# Patient Record
Sex: MALE | Race: WHITE | NOT HISPANIC OR LATINO | Employment: FULL TIME | URBAN - METROPOLITAN AREA
[De-identification: names, ages, dates, MRNs, and addresses within clinical notes are randomized per-mention and may not be internally consistent; named-entity substitution may affect disease eponyms.]

---

## 2017-08-11 ENCOUNTER — OFFICE VISIT (OUTPATIENT)
Dept: CARDIOLOGY | Facility: CLINIC | Age: 57
End: 2017-08-11
Payer: COMMERCIAL

## 2017-08-11 VITALS
SYSTOLIC BLOOD PRESSURE: 135 MMHG | DIASTOLIC BLOOD PRESSURE: 76 MMHG | BODY MASS INDEX: 36.62 KG/M2 | HEART RATE: 81 BPM | WEIGHT: 241.63 LBS | HEIGHT: 68 IN

## 2017-08-11 DIAGNOSIS — I25.10 CORONARY ARTERY DISEASE INVOLVING NATIVE CORONARY ARTERY OF NATIVE HEART WITHOUT ANGINA PECTORIS: Chronic | ICD-10-CM

## 2017-08-11 DIAGNOSIS — E78.5 DYSLIPIDEMIA: Chronic | ICD-10-CM

## 2017-08-11 DIAGNOSIS — Z95.5 STENTED CORONARY ARTERY: Chronic | ICD-10-CM

## 2017-08-11 DIAGNOSIS — I10 HYPERTENSION, ESSENTIAL: Primary | ICD-10-CM

## 2017-08-11 PROCEDURE — 99204 OFFICE O/P NEW MOD 45 MIN: CPT | Mod: S$GLB,,, | Performed by: INTERNAL MEDICINE

## 2017-08-11 PROCEDURE — 99999 PR PBB SHADOW E&M-EST. PATIENT-LVL III: CPT | Mod: PBBFAC,,, | Performed by: INTERNAL MEDICINE

## 2017-08-11 PROCEDURE — 3008F BODY MASS INDEX DOCD: CPT | Mod: S$GLB,,, | Performed by: INTERNAL MEDICINE

## 2017-08-11 PROCEDURE — 3078F DIAST BP <80 MM HG: CPT | Mod: S$GLB,,, | Performed by: INTERNAL MEDICINE

## 2017-08-11 PROCEDURE — 3075F SYST BP GE 130 - 139MM HG: CPT | Mod: S$GLB,,, | Performed by: INTERNAL MEDICINE

## 2017-08-11 RX ORDER — CLOPIDOGREL BISULFATE 75 MG/1
75 TABLET ORAL DAILY
Refills: 6 | COMMUNITY
Start: 2017-07-12 | End: 2017-08-11 | Stop reason: SDUPTHER

## 2017-08-11 RX ORDER — CLOPIDOGREL BISULFATE 75 MG/1
75 TABLET ORAL DAILY
Qty: 90 TABLET | Refills: 6 | Status: SHIPPED | OUTPATIENT
Start: 2017-08-11 | End: 2018-08-27 | Stop reason: SDUPTHER

## 2017-08-11 RX ORDER — LISINOPRIL 10 MG/1
10 TABLET ORAL DAILY
Qty: 90 TABLET | Refills: 6 | Status: SHIPPED | OUTPATIENT
Start: 2017-08-11 | End: 2018-01-15 | Stop reason: SINTOL

## 2017-08-11 RX ORDER — NITROGLYCERIN 0.4 MG/1
TABLET SUBLINGUAL
Refills: 6 | COMMUNITY
Start: 2017-06-16 | End: 2017-08-11 | Stop reason: SDUPTHER

## 2017-08-11 RX ORDER — METOPROLOL SUCCINATE 50 MG/1
50 TABLET, EXTENDED RELEASE ORAL DAILY
Refills: 6 | COMMUNITY
Start: 2017-07-16 | End: 2017-08-11 | Stop reason: SDUPTHER

## 2017-08-11 RX ORDER — NITROGLYCERIN 0.4 MG/1
0.4 TABLET SUBLINGUAL EVERY 5 MIN PRN
Qty: 25 TABLET | Refills: 6 | Status: SHIPPED | OUTPATIENT
Start: 2017-08-11 | End: 2018-09-29 | Stop reason: SDUPTHER

## 2017-08-11 RX ORDER — LISINOPRIL 10 MG/1
10 TABLET ORAL 2 TIMES DAILY
Refills: 6 | COMMUNITY
Start: 2017-07-16 | End: 2017-08-11 | Stop reason: SDUPTHER

## 2017-08-11 RX ORDER — ASPIRIN 81 MG/1
81 TABLET ORAL DAILY
Qty: 30 TABLET | Refills: 11
Start: 2017-08-11 | End: 2020-07-28

## 2017-08-11 RX ORDER — ATORVASTATIN CALCIUM 40 MG/1
40 TABLET, FILM COATED ORAL DAILY
Refills: 6 | COMMUNITY
Start: 2017-07-16 | End: 2017-08-11 | Stop reason: SDUPTHER

## 2017-08-11 RX ORDER — ASPIRIN 325 MG
325 TABLET ORAL DAILY
Refills: 6 | COMMUNITY
Start: 2017-07-21 | End: 2017-08-11

## 2017-08-11 RX ORDER — ATORVASTATIN CALCIUM 40 MG/1
40 TABLET, FILM COATED ORAL DAILY
Qty: 90 TABLET | Refills: 6 | Status: SHIPPED | OUTPATIENT
Start: 2017-08-11 | End: 2018-05-26 | Stop reason: SDUPTHER

## 2017-08-11 RX ORDER — METOPROLOL SUCCINATE 50 MG/1
50 TABLET, EXTENDED RELEASE ORAL DAILY
Qty: 90 TABLET | Refills: 6 | Status: SHIPPED | OUTPATIENT
Start: 2017-08-11 | End: 2018-05-26 | Stop reason: SDUPTHER

## 2017-08-11 NOTE — PROGRESS NOTES
Subjective:    Patient ID:  Niraj Diaz is a 57 y.o. male who presents for evaluation of No chief complaint on file.      HPI  Here to establish/transfer care from Dr. DEV Spears concerning CAD-PCI (GARY overlapping 3/2017 ). Had classic angina none since PCI. Very active.     Review of Systems   Constitution: Negative for decreased appetite and malaise/fatigue.   HENT: Negative for congestion and nosebleeds.    Eyes: Negative for blurred vision.   Cardiovascular: Negative for chest pain, claudication, cyanosis, dyspnea on exertion, irregular heartbeat, leg swelling, near-syncope, orthopnea, palpitations, paroxysmal nocturnal dyspnea and syncope.   Respiratory: Negative for cough and shortness of breath.    Endocrine: Negative for polyuria.   Hematologic/Lymphatic: Does not bruise/bleed easily.   Musculoskeletal: Negative for back pain, falls, joint pain, joint swelling, muscle cramps, muscle weakness and myalgias.   Gastrointestinal: Negative for bloating, abdominal pain, change in bowel habit, nausea and vomiting.   Genitourinary: Negative for urgency.   Neurological: Negative for dizziness, focal weakness and light-headedness.   Psychiatric/Behavioral: Negative for altered mental status.        Objective:    Physical Exam   Constitutional: He is oriented to person, place, and time. He appears well-developed and well-nourished. He is cooperative.   HENT:   Head: Normocephalic and atraumatic.   Eyes: Conjunctivae are normal. Right eye exhibits no exudate. Left eye exhibits no exudate.   Neck: Normal range of motion. Neck supple. Normal carotid pulses and no JVD present. Carotid bruit is not present. No thyromegaly present.   Cardiovascular: Normal rate, regular rhythm, normal heart sounds and intact distal pulses.    Pulses:       Carotid pulses are 2+ on the right side, and 2+ on the left side.       Radial pulses are 2+ on the right side, and 2+ on the left side.        Dorsalis pedis pulses are 2+ on the right  side, and 2+ on the left side.        Posterior tibial pulses are 2+ on the right side, and 2+ on the left side.   Pulmonary/Chest: Effort normal and breath sounds normal.   Abdominal: Soft. Bowel sounds are normal.   Musculoskeletal: Normal range of motion. He exhibits no edema.   Neurological: He is alert and oriented to person, place, and time. Gait normal.   Skin: Skin is warm, dry and intact. No cyanosis. Nails show no clubbing.   Psychiatric: He has a normal mood and affect. His speech is normal and behavior is normal. Judgment and thought content normal.   Nursing note and vitals reviewed.        4/2017 LDL 54     HDL 35    ..    Chemistry        Component Value Date/Time     10/29/2013 0826    K 3.8 10/29/2013 0826     10/29/2013 0826    CO2 29 10/29/2013 0826    BUN 10 10/29/2013 0826    CREATININE 1.0 10/29/2013 0826    GLU 94 10/29/2013 0826        Component Value Date/Time    CALCIUM 10.0 10/29/2013 0826    ALKPHOS 76 10/29/2013 0826    AST 24 10/29/2013 0826    ALT 39 10/29/2013 0826    BILITOT 1.0 10/29/2013 0826    ESTGFRAFRICA >60.0 10/29/2013 0826    EGFRNONAA >60.0 10/29/2013 0826            ..  Lab Results   Component Value Date    CHOL 226 (H) 10/29/2013    CHOL 216 (H) 01/14/2009    CHOL 216 (H) 07/24/2006     Lab Results   Component Value Date    HDL 49 10/29/2013    HDL 47 01/14/2009    HDL 47.0 07/24/2006     Lab Results   Component Value Date    LDLCALC 156.6 10/29/2013    LDLCALC 144.8 (H) 01/14/2009    LDLCALC 131.4 (H) 07/24/2006     Lab Results   Component Value Date    TRIG 102 10/29/2013    TRIG 121 01/14/2009    TRIG 188 (H) 07/24/2006     Lab Results   Component Value Date    CHOLHDL 21.7 10/29/2013    CHOLHDL 21.8 01/14/2009    CHOLHDL 21.8 07/24/2006     ..  Lab Results   Component Value Date    WBC 9.55 10/29/2013    HGB 17.1 10/29/2013    HCT 48.0 10/29/2013    MCV 81 (L) 10/29/2013     10/29/2013       Test(s) Reviewed  I have reviewed the following in  detail:  [] Stress test   [x] Angiography   [x] Echocardiogram   [x] Labs   [x] Other:       Assessment:         ICD-10-CM ICD-9-CM   1. Hypertension, essential I10 401.9   2. Stented coronary artery Z95.5 V45.82   3. Coronary artery disease involving native coronary artery of native heart without angina pectoris I25.10 414.01   4. Dyslipidemia E78.5 272.4     Problem List Items Addressed This Visit     Coronary artery disease involving native coronary artery of native heart (Chronic)    Dyslipidemia (Chronic)    Hypertension, essential - Primary    Stented coronary artery (Chronic)      Other Visit Diagnoses    None.          Plan:           Return to clinic 6 months   Aerobic exercise 5x's/wk. Heart healthy diet and risk factor modification.    See labs and med orders.  Get LV records  Tm Nuc/CFD.

## 2017-09-07 ENCOUNTER — PATIENT MESSAGE (OUTPATIENT)
Dept: CARDIOLOGY | Facility: CLINIC | Age: 57
End: 2017-09-07

## 2017-09-13 ENCOUNTER — PATIENT MESSAGE (OUTPATIENT)
Dept: CARDIOLOGY | Facility: CLINIC | Age: 57
End: 2017-09-13

## 2017-10-13 ENCOUNTER — HOSPITAL ENCOUNTER (OUTPATIENT)
Dept: RADIOLOGY | Facility: HOSPITAL | Age: 57
Discharge: HOME | End: 2017-10-13
Attending: INTERNAL MEDICINE
Payer: COMMERCIAL

## 2017-10-13 ENCOUNTER — CLINICAL SUPPORT (OUTPATIENT)
Dept: CARDIOLOGY | Facility: CLINIC | Age: 57
End: 2017-10-13
Payer: COMMERCIAL

## 2017-10-13 DIAGNOSIS — Z95.5 STENTED CORONARY ARTERY: Chronic | ICD-10-CM

## 2017-10-13 DIAGNOSIS — I25.10 CORONARY ARTERY DISEASE INVOLVING NATIVE CORONARY ARTERY OF NATIVE HEART WITHOUT ANGINA PECTORIS: Chronic | ICD-10-CM

## 2017-10-13 PROCEDURE — 78452 HT MUSCLE IMAGE SPECT MULT: CPT | Mod: TC,PO

## 2017-10-13 PROCEDURE — 78452 HT MUSCLE IMAGE SPECT MULT: CPT | Mod: 26,,, | Performed by: INTERNAL MEDICINE

## 2017-10-13 PROCEDURE — 93306 TTE W/DOPPLER COMPLETE: CPT | Mod: S$GLB,,, | Performed by: INTERNAL MEDICINE

## 2017-10-13 PROCEDURE — 93015 CV STRESS TEST SUPVJ I&R: CPT | Mod: S$GLB,,, | Performed by: INTERNAL MEDICINE

## 2017-10-16 ENCOUNTER — TELEPHONE (OUTPATIENT)
Dept: CARDIOLOGY | Facility: CLINIC | Age: 57
End: 2017-10-16

## 2017-10-16 LAB
DIASTOLIC DYSFUNCTION: NO
DIASTOLIC DYSFUNCTION: YES
RETIRED EF AND QEF - SEE NOTES: 55 (ref 55–65)
TRICUSPID VALVE REGURGITATION: ABNORMAL

## 2017-10-16 NOTE — TELEPHONE ENCOUNTER
----- Message from Bunny Magaña MD sent at 10/16/2017  1:09 PM CDT -----  Imaging tests results are with in normal parameters. Continue current meds.

## 2017-10-16 NOTE — TELEPHONE ENCOUNTER
Attempted to contact patient no answer left a message, echo test results are within normal parameters as per Dr. Magaña.

## 2017-10-16 NOTE — TELEPHONE ENCOUNTER
----- Message from Bunny Magaña MD sent at 10/16/2017 10:29 AM CDT -----  Imaging tests results are with in normal parameters. Continue current meds.

## 2017-10-18 ENCOUNTER — TELEPHONE (OUTPATIENT)
Dept: CARDIOLOGY | Facility: CLINIC | Age: 57
End: 2017-10-18

## 2017-10-18 NOTE — TELEPHONE ENCOUNTER
----- Message from Bunny Magaña MD sent at 10/18/2017  8:35 AM CDT -----  Imaging tests results are with in normal parameters. Continue current meds.

## 2018-01-12 ENCOUNTER — PATIENT MESSAGE (OUTPATIENT)
Dept: CARDIOLOGY | Facility: CLINIC | Age: 58
End: 2018-01-12

## 2018-01-16 RX ORDER — VALSARTAN 80 MG/1
80 TABLET ORAL DAILY
Qty: 90 TABLET | Refills: 3 | Status: SHIPPED | OUTPATIENT
Start: 2018-01-16 | End: 2018-03-05 | Stop reason: SDUPTHER

## 2018-01-20 ENCOUNTER — PATIENT MESSAGE (OUTPATIENT)
Dept: CARDIOLOGY | Facility: CLINIC | Age: 58
End: 2018-01-20

## 2018-01-31 ENCOUNTER — TELEPHONE (OUTPATIENT)
Dept: FAMILY MEDICINE | Facility: CLINIC | Age: 58
End: 2018-01-31

## 2018-01-31 NOTE — TELEPHONE ENCOUNTER
I would advise him to follow up with his PCP. Is Lor still his PCP? It appears that he hasn't seen him since 2016. Our clinic is not accepting new patients at present.

## 2018-01-31 NOTE — TELEPHONE ENCOUNTER
----- Message from Ashlie Hernandez sent at 1/31/2018  7:12 AM CST -----  Contact: Patients wife, Aure  Ms Aure would like for her  to be seen today for stomach issues but patient has not been seen in [place] since past 2014, was explained that after 3 years patient is considered a new patient and panel is closed, but Ms Diaz states she see's Ms Herrera and wants her to see her . Please call her back at [phone]. Thank you

## 2018-02-28 ENCOUNTER — PATIENT MESSAGE (OUTPATIENT)
Dept: CARDIOLOGY | Facility: CLINIC | Age: 58
End: 2018-02-28

## 2018-03-05 ENCOUNTER — PATIENT MESSAGE (OUTPATIENT)
Dept: CARDIOLOGY | Facility: CLINIC | Age: 58
End: 2018-03-05

## 2018-03-05 ENCOUNTER — PATIENT MESSAGE (OUTPATIENT)
Dept: PAIN MEDICINE | Facility: CLINIC | Age: 58
End: 2018-03-05

## 2018-03-06 RX ORDER — VALSARTAN 80 MG/1
80 TABLET ORAL DAILY
Qty: 90 TABLET | Refills: 4 | Status: SHIPPED | OUTPATIENT
Start: 2018-03-06 | End: 2018-08-27 | Stop reason: SDUPTHER

## 2018-05-04 ENCOUNTER — OFFICE VISIT (OUTPATIENT)
Dept: CARDIOLOGY | Facility: CLINIC | Age: 58
End: 2018-05-04
Payer: COMMERCIAL

## 2018-05-04 VITALS
SYSTOLIC BLOOD PRESSURE: 136 MMHG | DIASTOLIC BLOOD PRESSURE: 87 MMHG | HEIGHT: 68 IN | BODY MASS INDEX: 35.25 KG/M2 | WEIGHT: 232.56 LBS | HEART RATE: 72 BPM

## 2018-05-04 DIAGNOSIS — Z95.5 STENTED CORONARY ARTERY: Chronic | ICD-10-CM

## 2018-05-04 DIAGNOSIS — E78.5 DYSLIPIDEMIA: Chronic | ICD-10-CM

## 2018-05-04 DIAGNOSIS — I10 HYPERTENSION, ESSENTIAL: ICD-10-CM

## 2018-05-04 DIAGNOSIS — I25.10 CORONARY ARTERY DISEASE INVOLVING NATIVE CORONARY ARTERY OF NATIVE HEART WITHOUT ANGINA PECTORIS: Primary | Chronic | ICD-10-CM

## 2018-05-04 PROCEDURE — 3075F SYST BP GE 130 - 139MM HG: CPT | Mod: CPTII,S$GLB,, | Performed by: INTERNAL MEDICINE

## 2018-05-04 PROCEDURE — 99214 OFFICE O/P EST MOD 30 MIN: CPT | Mod: S$GLB,,, | Performed by: INTERNAL MEDICINE

## 2018-05-04 PROCEDURE — 3008F BODY MASS INDEX DOCD: CPT | Mod: CPTII,S$GLB,, | Performed by: INTERNAL MEDICINE

## 2018-05-04 PROCEDURE — 99999 PR PBB SHADOW E&M-EST. PATIENT-LVL III: CPT | Mod: PBBFAC,,, | Performed by: INTERNAL MEDICINE

## 2018-05-04 PROCEDURE — 3079F DIAST BP 80-89 MM HG: CPT | Mod: CPTII,S$GLB,, | Performed by: INTERNAL MEDICINE

## 2018-05-04 NOTE — PROGRESS NOTES
Subjective:    Patient ID:  Niraj Diaz is a 58 y.o. male who presents for follow-up of Coronary Artery Disease and Hypertension      HPI  Here for follow up of CAD-PCI (GARY overlapping 3/2017 ). Had classic angina none since PCI. Patients states is doing well no chest pain, SOB or change in exertional tolerence. Patient is exercising regularly with out symptoms.    Review of Systems   Constitution: Negative for malaise/fatigue.   Eyes: Negative for blurred vision.   Cardiovascular: Negative for chest pain, claudication, cyanosis, dyspnea on exertion, irregular heartbeat, leg swelling, near-syncope, orthopnea, palpitations, paroxysmal nocturnal dyspnea and syncope.   Respiratory: Negative for cough and shortness of breath.    Hematologic/Lymphatic: Does not bruise/bleed easily.   Musculoskeletal: Negative for back pain, falls, joint pain, muscle cramps, muscle weakness and myalgias.   Gastrointestinal: Negative for abdominal pain, change in bowel habit, nausea and vomiting.   Genitourinary: Negative for urgency.   Neurological: Negative for dizziness, focal weakness and light-headedness.        Objective:    Physical Exam   Constitutional: He is oriented to person, place, and time. He appears well-developed and well-nourished. He is cooperative.   HENT:   Head: Normocephalic and atraumatic.   Eyes: Conjunctivae are normal. Right eye exhibits no exudate. Left eye exhibits no exudate.   Neck: Normal range of motion. Neck supple. Normal carotid pulses and no JVD present. Carotid bruit is not present. No thyromegaly present.   Cardiovascular: Normal rate, regular rhythm, normal heart sounds and intact distal pulses.    Pulses:       Carotid pulses are 2+ on the right side, and 2+ on the left side.       Radial pulses are 2+ on the right side, and 2+ on the left side.        Dorsalis pedis pulses are 2+ on the right side, and 2+ on the left side.        Posterior tibial pulses are 2+ on the right side, and 2+ on the  left side.   Pulmonary/Chest: Effort normal and breath sounds normal.   Abdominal: Soft. Bowel sounds are normal.   Musculoskeletal: Normal range of motion. He exhibits no edema.   Neurological: He is alert and oriented to person, place, and time. Gait normal.   Skin: Skin is warm, dry and intact. No cyanosis. Nails show no clubbing.   Psychiatric: He has a normal mood and affect. His speech is normal and behavior is normal. Judgment and thought content normal.   Nursing note and vitals reviewed.            ..    Chemistry        Component Value Date/Time     09/03/2017 1043    K 4.2 09/03/2017 1043     09/03/2017 1043    CO2 27 09/03/2017 1043    BUN 9 09/03/2017 1043    CREATININE 0.78 09/03/2017 1043     (H) 09/03/2017 1043        Component Value Date/Time    CALCIUM 9.8 09/03/2017 1043    ALKPHOS 78 09/03/2017 1043    AST 33 09/03/2017 1043    ALT 49 (H) 09/03/2017 1043    BILITOT 0.8 09/03/2017 1043    ESTGFRAFRICA >60 09/03/2017 1043    EGFRNONAA >60 09/03/2017 1043            ..  Lab Results   Component Value Date    CHOL 226 (H) 10/29/2013    CHOL 216 (H) 01/14/2009    CHOL 216 (H) 07/24/2006     Lab Results   Component Value Date    HDL 49 10/29/2013    HDL 47 01/14/2009    HDL 47.0 07/24/2006     Lab Results   Component Value Date    LDLCALC 156.6 10/29/2013    LDLCALC 144.8 (H) 01/14/2009    LDLCALC 131.4 (H) 07/24/2006     Lab Results   Component Value Date    TRIG 102 10/29/2013    TRIG 121 01/14/2009    TRIG 188 (H) 07/24/2006     Lab Results   Component Value Date    CHOLHDL 21.7 10/29/2013    CHOLHDL 21.8 01/14/2009    CHOLHDL 21.8 07/24/2006     ..  Lab Results   Component Value Date    WBC 7.24 09/03/2017    HGB 15.7 09/03/2017    HCT 45.1 09/03/2017    MCV 86 09/03/2017     09/03/2017       Test(s) Reviewed  I have reviewed the following in detail:  [] Stress test   [] Angiography   [x] Echocardiogram   [x] Labs   [x] Other:       Assessment:         ICD-10-CM ICD-9-CM    1. Coronary artery disease involving native coronary artery of native heart without angina pectoris I25.10 414.01   2. Hypertension, essential I10 401.9   3. Stented coronary artery Z95.5 V45.82   4. Dyslipidemia E78.5 272.4     Problem List Items Addressed This Visit     Coronary artery disease involving native coronary artery of native heart - Primary (Chronic)    Overview     3/2017 lhc (LV)-LM- 35%, LAD-95% mid, 75% distal. D1- 60%. CX-95%. RCA-35%         Dyslipidemia (Chronic)    Hypertension, essential    Stented coronary artery (Chronic)    Overview     3/2017-LAD- synergy 3x28 distal and mid post 3.5. CX-2.5x38 resolute post 3.5                Plan:           Return to clinic 9 months   Aerobic exercise 5x's/wk. Heart healthy diet and risk factor modification.    See labs and med orders.

## 2018-05-26 DIAGNOSIS — I25.10 CORONARY ARTERY DISEASE INVOLVING NATIVE CORONARY ARTERY OF NATIVE HEART WITHOUT ANGINA PECTORIS: Chronic | ICD-10-CM

## 2018-05-26 DIAGNOSIS — E78.5 DYSLIPIDEMIA: Chronic | ICD-10-CM

## 2018-05-26 DIAGNOSIS — Z95.5 STENTED CORONARY ARTERY: Chronic | ICD-10-CM

## 2018-05-28 RX ORDER — ATORVASTATIN CALCIUM 40 MG/1
TABLET, FILM COATED ORAL
Qty: 30 TABLET | Refills: 6 | Status: SHIPPED | OUTPATIENT
Start: 2018-05-28 | End: 2019-01-22 | Stop reason: SDUPTHER

## 2018-05-28 RX ORDER — METOPROLOL SUCCINATE 50 MG/1
TABLET, EXTENDED RELEASE ORAL
Qty: 30 TABLET | Refills: 6 | Status: SHIPPED | OUTPATIENT
Start: 2018-05-28 | End: 2019-01-03 | Stop reason: SDUPTHER

## 2018-07-09 ENCOUNTER — OFFICE VISIT (OUTPATIENT)
Dept: OPTOMETRY | Facility: CLINIC | Age: 58
End: 2018-07-09
Payer: COMMERCIAL

## 2018-07-09 DIAGNOSIS — H16.202 KERATOCONJUNCTIVITIS OF LEFT EYE: Primary | ICD-10-CM

## 2018-07-09 PROCEDURE — 92002 INTRM OPH EXAM NEW PATIENT: CPT | Mod: S$GLB,,, | Performed by: OPTOMETRIST

## 2018-07-09 PROCEDURE — 99999 PR PBB SHADOW E&M-EST. PATIENT-LVL I: CPT | Mod: PBBFAC,,, | Performed by: OPTOMETRIST

## 2018-07-14 NOTE — PROGRESS NOTES
HPI     58yr old male here for FBS OS. Pt was using a weed eater when something   flew in his left eye x 2days. Pt feels like something still in the eye   today, no pain just discomfort. Pt having dryness and tearing OS, he used   Visine PRN-OU. Some blurriness OS this morning.    Last edited by Constantin Khalil on 7/9/2018 10:14 AM. (History)            Assessment /Plan     For exam results, see Encounter Report.    Keratoconjunctivitis of left eye  No foreign body seen with lid eversion today    -     tobramycin-lotepred (ZYLET) 0.3-0.5 % DrpS; Apply 1 drop to eye 4 (four) times daily. for 7 days  Dispense: 5 mL; Refill: 0     RTC 1 month for complete exam/ DFE

## 2018-08-20 ENCOUNTER — PATIENT MESSAGE (OUTPATIENT)
Dept: ADMINISTRATIVE | Facility: HOSPITAL | Age: 58
End: 2018-08-20

## 2018-08-26 ENCOUNTER — PATIENT MESSAGE (OUTPATIENT)
Dept: CARDIOLOGY | Facility: CLINIC | Age: 58
End: 2018-08-26

## 2018-08-26 DIAGNOSIS — E78.5 DYSLIPIDEMIA: Chronic | ICD-10-CM

## 2018-08-26 DIAGNOSIS — Z95.5 STENTED CORONARY ARTERY: Chronic | ICD-10-CM

## 2018-08-26 DIAGNOSIS — I25.10 CORONARY ARTERY DISEASE INVOLVING NATIVE CORONARY ARTERY OF NATIVE HEART WITHOUT ANGINA PECTORIS: Chronic | ICD-10-CM

## 2018-08-27 RX ORDER — CLOPIDOGREL BISULFATE 75 MG/1
75 TABLET ORAL DAILY
Qty: 90 TABLET | Refills: 6 | Status: SHIPPED | OUTPATIENT
Start: 2018-08-27 | End: 2019-11-21 | Stop reason: SDUPTHER

## 2018-08-27 RX ORDER — VALSARTAN 80 MG/1
80 TABLET ORAL DAILY
Qty: 90 TABLET | Refills: 4 | Status: SHIPPED | OUTPATIENT
Start: 2018-08-27 | End: 2019-03-03 | Stop reason: SDUPTHER

## 2018-09-29 DIAGNOSIS — E78.5 DYSLIPIDEMIA: Chronic | ICD-10-CM

## 2018-09-29 DIAGNOSIS — I25.10 CORONARY ARTERY DISEASE INVOLVING NATIVE CORONARY ARTERY OF NATIVE HEART WITHOUT ANGINA PECTORIS: Chronic | ICD-10-CM

## 2018-09-29 DIAGNOSIS — Z95.5 STENTED CORONARY ARTERY: Chronic | ICD-10-CM

## 2018-10-01 RX ORDER — NITROGLYCERIN 0.4 MG/1
TABLET SUBLINGUAL
Qty: 25 TABLET | Refills: 4 | Status: SHIPPED | OUTPATIENT
Start: 2018-10-01 | End: 2019-02-08 | Stop reason: SDUPTHER

## 2019-01-03 DIAGNOSIS — E78.5 DYSLIPIDEMIA: Chronic | ICD-10-CM

## 2019-01-03 DIAGNOSIS — Z95.5 STENTED CORONARY ARTERY: Chronic | ICD-10-CM

## 2019-01-03 DIAGNOSIS — I25.10 CORONARY ARTERY DISEASE INVOLVING NATIVE CORONARY ARTERY OF NATIVE HEART WITHOUT ANGINA PECTORIS: Chronic | ICD-10-CM

## 2019-01-08 RX ORDER — METOPROLOL SUCCINATE 50 MG/1
TABLET, EXTENDED RELEASE ORAL
Qty: 30 TABLET | Refills: 4 | Status: SHIPPED | OUTPATIENT
Start: 2019-01-08 | End: 2019-06-15 | Stop reason: SDUPTHER

## 2019-01-18 ENCOUNTER — OFFICE VISIT (OUTPATIENT)
Dept: CARDIOLOGY | Facility: CLINIC | Age: 59
End: 2019-01-18
Payer: COMMERCIAL

## 2019-01-18 VITALS
DIASTOLIC BLOOD PRESSURE: 65 MMHG | WEIGHT: 208.31 LBS | HEIGHT: 68 IN | HEART RATE: 68 BPM | BODY MASS INDEX: 31.57 KG/M2 | SYSTOLIC BLOOD PRESSURE: 125 MMHG

## 2019-01-18 DIAGNOSIS — I10 HYPERTENSION, ESSENTIAL: ICD-10-CM

## 2019-01-18 DIAGNOSIS — I25.10 CORONARY ARTERY DISEASE INVOLVING NATIVE CORONARY ARTERY OF NATIVE HEART WITHOUT ANGINA PECTORIS: Chronic | ICD-10-CM

## 2019-01-18 DIAGNOSIS — E78.5 DYSLIPIDEMIA: Chronic | ICD-10-CM

## 2019-01-18 DIAGNOSIS — Z95.5 STENTED CORONARY ARTERY: Primary | Chronic | ICD-10-CM

## 2019-01-18 PROCEDURE — 99214 OFFICE O/P EST MOD 30 MIN: CPT | Mod: S$GLB,,, | Performed by: INTERNAL MEDICINE

## 2019-01-18 PROCEDURE — 3008F PR BODY MASS INDEX (BMI) DOCUMENTED: ICD-10-PCS | Mod: CPTII,S$GLB,, | Performed by: INTERNAL MEDICINE

## 2019-01-18 PROCEDURE — 3078F PR MOST RECENT DIASTOLIC BLOOD PRESSURE < 80 MM HG: ICD-10-PCS | Mod: CPTII,S$GLB,, | Performed by: INTERNAL MEDICINE

## 2019-01-18 PROCEDURE — 99999 PR PBB SHADOW E&M-EST. PATIENT-LVL III: ICD-10-PCS | Mod: PBBFAC,,, | Performed by: INTERNAL MEDICINE

## 2019-01-18 PROCEDURE — 3008F BODY MASS INDEX DOCD: CPT | Mod: CPTII,S$GLB,, | Performed by: INTERNAL MEDICINE

## 2019-01-18 PROCEDURE — 99999 PR PBB SHADOW E&M-EST. PATIENT-LVL III: CPT | Mod: PBBFAC,,, | Performed by: INTERNAL MEDICINE

## 2019-01-18 PROCEDURE — 3078F DIAST BP <80 MM HG: CPT | Mod: CPTII,S$GLB,, | Performed by: INTERNAL MEDICINE

## 2019-01-18 PROCEDURE — 99214 PR OFFICE/OUTPT VISIT, EST, LEVL IV, 30-39 MIN: ICD-10-PCS | Mod: S$GLB,,, | Performed by: INTERNAL MEDICINE

## 2019-01-18 PROCEDURE — 3074F PR MOST RECENT SYSTOLIC BLOOD PRESSURE < 130 MM HG: ICD-10-PCS | Mod: CPTII,S$GLB,, | Performed by: INTERNAL MEDICINE

## 2019-01-18 PROCEDURE — 3074F SYST BP LT 130 MM HG: CPT | Mod: CPTII,S$GLB,, | Performed by: INTERNAL MEDICINE

## 2019-01-18 NOTE — PROGRESS NOTES
Subjective:    Patient ID:  Niraj Diaz is a 58 y.o. male who presents for follow-up of Follow-up      HPI  Here for follow up of CAD-PCI (GARY overlapping 3/2017 ). Had classic angina none since PCI.   Patients states is doing well no chest pain, SOB or change in exertional tolerence. Patient is exercising regularly with out symptoms.    Review of Systems   Constitution: Negative for malaise/fatigue.   Eyes: Negative for blurred vision.   Cardiovascular: Negative for chest pain, claudication, cyanosis, dyspnea on exertion, irregular heartbeat, leg swelling, near-syncope, orthopnea, palpitations, paroxysmal nocturnal dyspnea and syncope.   Respiratory: Negative for cough and shortness of breath.    Hematologic/Lymphatic: Does not bruise/bleed easily.   Musculoskeletal: Negative for back pain, falls, joint pain, muscle cramps, muscle weakness and myalgias.   Gastrointestinal: Negative for abdominal pain, change in bowel habit, nausea and vomiting.   Genitourinary: Negative for urgency.   Neurological: Negative for dizziness, focal weakness and light-headedness.           Objective:    Physical Exam   Constitutional: He is oriented to person, place, and time. He appears well-developed and well-nourished. He is cooperative.   HENT:   Head: Normocephalic and atraumatic.   Eyes: Conjunctivae are normal. Right eye exhibits no exudate. Left eye exhibits no exudate.   Neck: Normal range of motion. Neck supple. Normal carotid pulses and no JVD present. Carotid bruit is not present. No thyromegaly present.   Cardiovascular: Normal rate, regular rhythm, normal heart sounds and intact distal pulses.   Pulses:       Carotid pulses are 2+ on the right side, and 2+ on the left side.       Radial pulses are 2+ on the right side, and 2+ on the left side.        Dorsalis pedis pulses are 2+ on the right side, and 2+ on the left side.        Posterior tibial pulses are 2+ on the right side, and 2+ on the left side.    Pulmonary/Chest: Effort normal and breath sounds normal.   Abdominal: Soft. Bowel sounds are normal.   Musculoskeletal: Normal range of motion. He exhibits no edema.   Neurological: He is alert and oriented to person, place, and time. Gait normal.   Skin: Skin is warm, dry and intact. No cyanosis. Nails show no clubbing.   Psychiatric: He has a normal mood and affect. His speech is normal and behavior is normal. Judgment and thought content normal.   Nursing note and vitals reviewed.              ..    Chemistry        Component Value Date/Time     09/03/2017 1043    K 4.2 09/03/2017 1043     09/03/2017 1043    CO2 27 09/03/2017 1043    BUN 9 09/03/2017 1043    CREATININE 0.78 09/03/2017 1043     (H) 09/03/2017 1043        Component Value Date/Time    CALCIUM 9.8 09/03/2017 1043    ALKPHOS 78 09/03/2017 1043    AST 33 09/03/2017 1043    ALT 49 (H) 09/03/2017 1043    BILITOT 0.8 09/03/2017 1043    ESTGFRAFRICA >60 09/03/2017 1043    EGFRNONAA >60 09/03/2017 1043            ..  Lab Results   Component Value Date    CHOL 226 (H) 10/29/2013    CHOL 216 (H) 01/14/2009    CHOL 216 (H) 07/24/2006     Lab Results   Component Value Date    HDL 49 10/29/2013    HDL 47 01/14/2009    HDL 47.0 07/24/2006     Lab Results   Component Value Date    LDLCALC 156.6 10/29/2013    LDLCALC 144.8 (H) 01/14/2009    LDLCALC 131.4 (H) 07/24/2006     Lab Results   Component Value Date    TRIG 102 10/29/2013    TRIG 121 01/14/2009    TRIG 188 (H) 07/24/2006     Lab Results   Component Value Date    CHOLHDL 21.7 10/29/2013    CHOLHDL 21.8 01/14/2009    CHOLHDL 21.8 07/24/2006     ..  Lab Results   Component Value Date    WBC 7.24 09/03/2017    HGB 15.7 09/03/2017    HCT 45.1 09/03/2017    MCV 86 09/03/2017     09/03/2017       Test(s) Reviewed  I have reviewed the following in detail:  [] Stress test   [x] Angiography   [x] Echocardiogram   [x] Labs   [] Other:       Assessment:         ICD-10-CM ICD-9-CM   1.  Stented coronary artery Z95.5 V45.82   2. Dyslipidemia E78.5 272.4   3. Coronary artery disease involving native coronary artery of native heart without angina pectoris I25.10 414.01   4. Hypertension, essential I10 401.9     Problem List Items Addressed This Visit     Stented coronary artery - Primary (Chronic)    Overview     3/2017-LAD- synergy 3x28 distal and mid post 3.5. CX-2.5x38 resolute post 3.5         Hypertension, essential    Dyslipidemia (Chronic)    Coronary artery disease involving native coronary artery of native heart (Chronic)    Overview     3/2017 lhc (LV)-LM- 35%, LAD-95% mid, 75% distal. D1- 60%. CX-95%. RCA-35%                Plan:             Return to clinic 6 months   Aerobic exercise 5x's/wk. Heart healthy diet and risk factor modification.    See labs and med orders.  cfd labs

## 2019-01-22 DIAGNOSIS — E78.5 DYSLIPIDEMIA: Chronic | ICD-10-CM

## 2019-01-22 DIAGNOSIS — Z95.5 STENTED CORONARY ARTERY: Chronic | ICD-10-CM

## 2019-01-22 DIAGNOSIS — I25.10 CORONARY ARTERY DISEASE INVOLVING NATIVE CORONARY ARTERY OF NATIVE HEART WITHOUT ANGINA PECTORIS: Chronic | ICD-10-CM

## 2019-01-22 RX ORDER — ATORVASTATIN CALCIUM 40 MG/1
TABLET, FILM COATED ORAL
Qty: 30 TABLET | Refills: 4 | Status: SHIPPED | OUTPATIENT
Start: 2019-01-22 | End: 2019-07-10 | Stop reason: SDUPTHER

## 2019-02-08 DIAGNOSIS — I25.10 CORONARY ARTERY DISEASE INVOLVING NATIVE CORONARY ARTERY OF NATIVE HEART WITHOUT ANGINA PECTORIS: Chronic | ICD-10-CM

## 2019-02-08 DIAGNOSIS — E78.5 DYSLIPIDEMIA: Chronic | ICD-10-CM

## 2019-02-08 DIAGNOSIS — Z95.5 STENTED CORONARY ARTERY: Chronic | ICD-10-CM

## 2019-02-08 RX ORDER — NITROGLYCERIN 0.4 MG/1
0.4 TABLET SUBLINGUAL EVERY 5 MIN PRN
Qty: 25 TABLET | Refills: 4 | Status: SHIPPED | OUTPATIENT
Start: 2019-02-08 | End: 2021-01-18 | Stop reason: SDUPTHER

## 2019-03-06 RX ORDER — VALSARTAN 80 MG/1
TABLET ORAL
Qty: 90 TABLET | Refills: 4 | Status: SHIPPED | OUTPATIENT
Start: 2019-03-06 | End: 2020-03-16 | Stop reason: SDUPTHER

## 2019-06-15 DIAGNOSIS — E78.5 DYSLIPIDEMIA: Chronic | ICD-10-CM

## 2019-06-15 DIAGNOSIS — I25.10 CORONARY ARTERY DISEASE INVOLVING NATIVE CORONARY ARTERY OF NATIVE HEART WITHOUT ANGINA PECTORIS: Chronic | ICD-10-CM

## 2019-06-15 DIAGNOSIS — Z95.5 STENTED CORONARY ARTERY: Chronic | ICD-10-CM

## 2019-06-19 DIAGNOSIS — E78.5 DYSLIPIDEMIA: Chronic | ICD-10-CM

## 2019-06-19 DIAGNOSIS — I25.10 CORONARY ARTERY DISEASE INVOLVING NATIVE CORONARY ARTERY OF NATIVE HEART WITHOUT ANGINA PECTORIS: Chronic | ICD-10-CM

## 2019-06-19 DIAGNOSIS — Z95.5 STENTED CORONARY ARTERY: Chronic | ICD-10-CM

## 2019-06-19 RX ORDER — METOPROLOL SUCCINATE 50 MG/1
TABLET, EXTENDED RELEASE ORAL
Qty: 30 TABLET | Refills: 4 | Status: SHIPPED | OUTPATIENT
Start: 2019-06-19 | End: 2019-06-19 | Stop reason: SDUPTHER

## 2019-06-19 NOTE — TELEPHONE ENCOUNTER
----- Message from Augustine Du sent at 6/19/2019  1:20 PM CDT -----  Contact: Preethi - [facility]  Type:  Pharmacy Calling to Clarify an RX    Name of Caller:  Preethi  Pharmacy Name:    [id number]  [place], [place] - [address] [address]  Phone: [phone] Fax: [phone]  Prescription Name:  metoprolol succinate (TOPROL-XL) 50 MG 24 hr tablet  What do they need to clarify?:  Refill authorization  Best Call Back Number:  [phone]  Additional Information:  NA

## 2019-06-20 ENCOUNTER — PATIENT MESSAGE (OUTPATIENT)
Dept: CARDIOLOGY | Facility: CLINIC | Age: 59
End: 2019-06-20

## 2019-06-20 RX ORDER — METOPROLOL SUCCINATE 50 MG/1
50 TABLET, EXTENDED RELEASE ORAL DAILY
Qty: 30 TABLET | Refills: 4 | Status: SHIPPED | OUTPATIENT
Start: 2019-06-20 | End: 2020-03-16 | Stop reason: SDUPTHER

## 2019-07-10 DIAGNOSIS — I25.10 CORONARY ARTERY DISEASE INVOLVING NATIVE CORONARY ARTERY OF NATIVE HEART WITHOUT ANGINA PECTORIS: Chronic | ICD-10-CM

## 2019-07-10 DIAGNOSIS — Z95.5 STENTED CORONARY ARTERY: Chronic | ICD-10-CM

## 2019-07-10 DIAGNOSIS — E78.5 DYSLIPIDEMIA: Chronic | ICD-10-CM

## 2019-07-12 RX ORDER — ATORVASTATIN CALCIUM 40 MG/1
TABLET, FILM COATED ORAL
Qty: 30 TABLET | Refills: 4 | Status: SHIPPED | OUTPATIENT
Start: 2019-07-12 | End: 2020-01-01

## 2019-11-21 DIAGNOSIS — I25.10 CORONARY ARTERY DISEASE INVOLVING NATIVE CORONARY ARTERY OF NATIVE HEART WITHOUT ANGINA PECTORIS: Chronic | ICD-10-CM

## 2019-11-21 DIAGNOSIS — Z95.5 STENTED CORONARY ARTERY: Chronic | ICD-10-CM

## 2019-11-21 DIAGNOSIS — E78.5 DYSLIPIDEMIA: Chronic | ICD-10-CM

## 2019-11-21 RX ORDER — CLOPIDOGREL BISULFATE 75 MG/1
75 TABLET ORAL DAILY
Qty: 90 TABLET | Refills: 4 | Status: SHIPPED | OUTPATIENT
Start: 2019-11-21 | End: 2021-01-18 | Stop reason: SDUPTHER

## 2020-01-01 DIAGNOSIS — E78.5 DYSLIPIDEMIA: Chronic | ICD-10-CM

## 2020-01-01 DIAGNOSIS — I25.10 CORONARY ARTERY DISEASE INVOLVING NATIVE CORONARY ARTERY OF NATIVE HEART WITHOUT ANGINA PECTORIS: Chronic | ICD-10-CM

## 2020-01-01 DIAGNOSIS — Z95.5 STENTED CORONARY ARTERY: Chronic | ICD-10-CM

## 2020-01-01 RX ORDER — ATORVASTATIN CALCIUM 40 MG/1
TABLET, FILM COATED ORAL
Qty: 30 TABLET | Refills: 4 | Status: SHIPPED | OUTPATIENT
Start: 2020-01-01 | End: 2020-05-06 | Stop reason: SDUPTHER

## 2020-03-16 DIAGNOSIS — I10 HYPERTENSION, ESSENTIAL: Primary | ICD-10-CM

## 2020-03-16 DIAGNOSIS — E78.5 DYSLIPIDEMIA: Chronic | ICD-10-CM

## 2020-03-16 DIAGNOSIS — Z95.5 STENTED CORONARY ARTERY: Chronic | ICD-10-CM

## 2020-03-16 DIAGNOSIS — I25.10 CORONARY ARTERY DISEASE INVOLVING NATIVE CORONARY ARTERY OF NATIVE HEART WITHOUT ANGINA PECTORIS: Chronic | ICD-10-CM

## 2020-03-16 RX ORDER — VALSARTAN 80 MG/1
80 TABLET ORAL DAILY
Qty: 30 TABLET | Refills: 4 | Status: SHIPPED | OUTPATIENT
Start: 2020-03-16 | End: 2020-07-09 | Stop reason: SDUPTHER

## 2020-03-16 RX ORDER — METOPROLOL SUCCINATE 50 MG/1
50 TABLET, EXTENDED RELEASE ORAL DAILY
Qty: 30 TABLET | Refills: 4 | Status: SHIPPED | OUTPATIENT
Start: 2020-03-16 | End: 2020-03-25 | Stop reason: SDUPTHER

## 2020-03-25 DIAGNOSIS — Z95.5 STENTED CORONARY ARTERY: Chronic | ICD-10-CM

## 2020-03-25 DIAGNOSIS — E78.5 DYSLIPIDEMIA: Chronic | ICD-10-CM

## 2020-03-25 DIAGNOSIS — I25.10 CORONARY ARTERY DISEASE INVOLVING NATIVE CORONARY ARTERY OF NATIVE HEART WITHOUT ANGINA PECTORIS: Chronic | ICD-10-CM

## 2020-03-25 RX ORDER — METOPROLOL SUCCINATE 50 MG/1
50 TABLET, EXTENDED RELEASE ORAL DAILY
Qty: 30 TABLET | Refills: 4 | Status: SHIPPED | OUTPATIENT
Start: 2020-03-25 | End: 2021-03-19 | Stop reason: SDUPTHER

## 2020-04-21 DIAGNOSIS — Z01.84 ANTIBODY RESPONSE EXAMINATION: ICD-10-CM

## 2020-05-06 DIAGNOSIS — E78.5 DYSLIPIDEMIA: Chronic | ICD-10-CM

## 2020-05-06 DIAGNOSIS — Z95.5 STENTED CORONARY ARTERY: Chronic | ICD-10-CM

## 2020-05-06 DIAGNOSIS — I25.10 CORONARY ARTERY DISEASE INVOLVING NATIVE CORONARY ARTERY OF NATIVE HEART WITHOUT ANGINA PECTORIS: Chronic | ICD-10-CM

## 2020-05-06 RX ORDER — ATORVASTATIN CALCIUM 40 MG/1
40 TABLET, FILM COATED ORAL DAILY
Qty: 30 TABLET | Refills: 4 | Status: SHIPPED | OUTPATIENT
Start: 2020-05-06 | End: 2020-08-03 | Stop reason: SDUPTHER

## 2020-05-21 DIAGNOSIS — Z01.84 ANTIBODY RESPONSE EXAMINATION: ICD-10-CM

## 2020-06-05 ENCOUNTER — TELEPHONE (OUTPATIENT)
Dept: CARDIOLOGY | Facility: CLINIC | Age: 60
End: 2020-06-05

## 2020-06-05 NOTE — TELEPHONE ENCOUNTER
Pt is having multiple teeth extractions, implant placement and will need IV sedation. Pt is on aspirin and plavix.  Please advise.     [facility] Fax [phone]

## 2020-06-20 DIAGNOSIS — Z01.84 ANTIBODY RESPONSE EXAMINATION: ICD-10-CM

## 2020-07-06 ENCOUNTER — TELEPHONE (OUTPATIENT)
Dept: INTERNAL MEDICINE | Facility: CLINIC | Age: 60
End: 2020-07-06

## 2020-07-06 NOTE — TELEPHONE ENCOUNTER
Please see if patient would like to schedule annual physical in about a month. I received ER note and saw that his Last visit in 2016. Thank you

## 2020-07-09 DIAGNOSIS — I10 HYPERTENSION, ESSENTIAL: ICD-10-CM

## 2020-07-09 RX ORDER — VALSARTAN 80 MG/1
80 TABLET ORAL DAILY
Qty: 90 TABLET | Refills: 4 | Status: SHIPPED | OUTPATIENT
Start: 2020-07-09 | End: 2021-07-19

## 2020-07-10 DIAGNOSIS — U07.1 COVID-19 VIRUS DETECTED: ICD-10-CM

## 2020-07-20 DIAGNOSIS — Z01.84 ANTIBODY RESPONSE EXAMINATION: ICD-10-CM

## 2020-07-28 ENCOUNTER — OFFICE VISIT (OUTPATIENT)
Dept: CARDIOLOGY | Facility: CLINIC | Age: 60
End: 2020-07-28
Payer: COMMERCIAL

## 2020-07-28 VITALS
DIASTOLIC BLOOD PRESSURE: 94 MMHG | HEIGHT: 68 IN | BODY MASS INDEX: 33.48 KG/M2 | SYSTOLIC BLOOD PRESSURE: 133 MMHG | HEART RATE: 82 BPM | WEIGHT: 220.88 LBS

## 2020-07-28 DIAGNOSIS — G47.33 OSA (OBSTRUCTIVE SLEEP APNEA): ICD-10-CM

## 2020-07-28 DIAGNOSIS — E78.5 DYSLIPIDEMIA: Chronic | ICD-10-CM

## 2020-07-28 DIAGNOSIS — I25.10 CORONARY ARTERY DISEASE INVOLVING NATIVE CORONARY ARTERY OF NATIVE HEART WITHOUT ANGINA PECTORIS: Chronic | ICD-10-CM

## 2020-07-28 DIAGNOSIS — Z95.5 STENTED CORONARY ARTERY: Primary | Chronic | ICD-10-CM

## 2020-07-28 DIAGNOSIS — I10 HYPERTENSION, ESSENTIAL: ICD-10-CM

## 2020-07-28 PROCEDURE — 99999 PR PBB SHADOW E&M-EST. PATIENT-LVL III: CPT | Mod: PBBFAC,,, | Performed by: INTERNAL MEDICINE

## 2020-07-28 PROCEDURE — 99214 PR OFFICE/OUTPT VISIT, EST, LEVL IV, 30-39 MIN: ICD-10-PCS | Mod: S$GLB,,, | Performed by: INTERNAL MEDICINE

## 2020-07-28 PROCEDURE — 3080F DIAST BP >= 90 MM HG: CPT | Mod: CPTII,S$GLB,, | Performed by: INTERNAL MEDICINE

## 2020-07-28 PROCEDURE — 3075F PR MOST RECENT SYSTOLIC BLOOD PRESS GE 130-139MM HG: ICD-10-PCS | Mod: CPTII,S$GLB,, | Performed by: INTERNAL MEDICINE

## 2020-07-28 PROCEDURE — 99999 PR PBB SHADOW E&M-EST. PATIENT-LVL III: ICD-10-PCS | Mod: PBBFAC,,, | Performed by: INTERNAL MEDICINE

## 2020-07-28 PROCEDURE — 3008F BODY MASS INDEX DOCD: CPT | Mod: CPTII,S$GLB,, | Performed by: INTERNAL MEDICINE

## 2020-07-28 PROCEDURE — 3008F PR BODY MASS INDEX (BMI) DOCUMENTED: ICD-10-PCS | Mod: CPTII,S$GLB,, | Performed by: INTERNAL MEDICINE

## 2020-07-28 PROCEDURE — 3075F SYST BP GE 130 - 139MM HG: CPT | Mod: CPTII,S$GLB,, | Performed by: INTERNAL MEDICINE

## 2020-07-28 PROCEDURE — 99214 OFFICE O/P EST MOD 30 MIN: CPT | Mod: S$GLB,,, | Performed by: INTERNAL MEDICINE

## 2020-07-28 PROCEDURE — 3080F PR MOST RECENT DIASTOLIC BLOOD PRESSURE >= 90 MM HG: ICD-10-PCS | Mod: CPTII,S$GLB,, | Performed by: INTERNAL MEDICINE

## 2020-07-28 NOTE — PROGRESS NOTES
Subjective:    Patient ID:  Niraj Diaz is a 60 y.o. male who presents for follow-up of No chief complaint on file.      HPI  Here for follow up of CAD-PCI (GARY overlapping 3/2017 ). Had classic angina none since PCI. Very active man.     Review of Systems   Constitution: Negative for malaise/fatigue.   Eyes: Negative for blurred vision.   Cardiovascular: Negative for chest pain, claudication, cyanosis, dyspnea on exertion, irregular heartbeat, leg swelling, near-syncope, orthopnea, palpitations, paroxysmal nocturnal dyspnea and syncope.   Respiratory: Negative for cough and shortness of breath.    Hematologic/Lymphatic: Does not bruise/bleed easily.   Musculoskeletal: Negative for back pain, falls, joint pain, muscle cramps, muscle weakness and myalgias.   Gastrointestinal: Negative for abdominal pain, change in bowel habit, nausea and vomiting.   Genitourinary: Negative for urgency.   Neurological: Negative for dizziness, focal weakness and light-headedness.       Past Medical History:   Diagnosis Date    Bulging lumbar disc     Hypertension      Patient Active Problem List   Diagnosis    Lumbar radiculopathy    HNP (herniated nucleus pulposus)    TASHI (obstructive sleep apnea)    DDD (degenerative disc disease), lumbar    Low back pain without sciatica    Hypertension, essential    Stented coronary artery    Coronary artery disease involving native coronary artery of native heart    Dyslipidemia        Objective:     Vitals:    07/28/20 0926   BP: (!) 133/94   Pulse: 82        Physical Exam   Constitutional: He is oriented to person, place, and time. He appears well-developed and well-nourished. He is cooperative.   HENT:   Head: Normocephalic and atraumatic.   Eyes: Conjunctivae are normal. Right eye exhibits no exudate. Left eye exhibits no exudate.   Neck: Normal range of motion. Neck supple. Normal carotid pulses and no JVD present. Carotid bruit is not present. No thyromegaly present.    Cardiovascular: Normal rate, regular rhythm, normal heart sounds and intact distal pulses.   Pulses:       Carotid pulses are 2+ on the right side and 2+ on the left side.       Radial pulses are 2+ on the right side and 2+ on the left side.        Dorsalis pedis pulses are 2+ on the right side and 2+ on the left side.        Posterior tibial pulses are 2+ on the right side and 2+ on the left side.   Pulmonary/Chest: Effort normal and breath sounds normal.   Abdominal: Soft. Bowel sounds are normal.   Musculoskeletal: Normal range of motion.         General: No edema.   Neurological: He is alert and oriented to person, place, and time. Gait normal.   Skin: Skin is warm, dry and intact. No cyanosis. Nails show no clubbing.   Psychiatric: He has a normal mood and affect. His speech is normal and behavior is normal. Judgment and thought content normal.   Nursing note and vitals reviewed.            ..    Chemistry        Component Value Date/Time     07/05/2020 1158    K 4.2 07/05/2020 1158     07/05/2020 1158    CO2 26 07/05/2020 1158    BUN 7 (L) 07/05/2020 1158    CREATININE 0.72 07/05/2020 1158     07/05/2020 1158        Component Value Date/Time    CALCIUM 9.5 07/05/2020 1158    ALKPHOS 94 07/05/2020 1158    AST 38 07/05/2020 1158    ALT 33 07/05/2020 1158    BILITOT 0.6 07/05/2020 1158    ESTGFRAFRICA >60 07/05/2020 1158    EGFRNONAA >60 07/05/2020 1158            ..  Lab Results   Component Value Date    CHOL 226 (H) 10/29/2013    CHOL 216 (H) 01/14/2009    CHOL 216 (H) 07/24/2006     Lab Results   Component Value Date    HDL 49 10/29/2013    HDL 47 01/14/2009    HDL 47.0 07/24/2006     Lab Results   Component Value Date    LDLCALC 156.6 10/29/2013    LDLCALC 144.8 (H) 01/14/2009    LDLCALC 131.4 (H) 07/24/2006     Lab Results   Component Value Date    TRIG 102 10/29/2013    TRIG 121 01/14/2009    TRIG 188 (H) 07/24/2006     Lab Results   Component Value Date    CHOLHDL 21.7 10/29/2013     CHOLHDL 21.8 01/14/2009    CHOLHDL 21.8 07/24/2006     ..  Lab Results   Component Value Date    WBC 5.86 07/05/2020    HGB 16.5 07/05/2020    HCT 46.7 07/05/2020    MCV 86 07/05/2020     07/05/2020       Test(s) Reviewed  I have reviewed the following in detail:  [] Stress test   [] Angiography   [x] Echocardiogram   [x] Labs   [] Other:       Assessment:         ICD-10-CM ICD-9-CM   1. Stented coronary artery  Z95.5 V45.82   2. Coronary artery disease involving native coronary artery of native heart without angina pectoris  I25.10 414.01   3. Dyslipidemia  E78.5 272.4   4. Hypertension, essential  I10 401.9   5. TASHI (obstructive sleep apnea)  G47.33 327.23     Problem List Items Addressed This Visit     Stented coronary artery - Primary (Chronic)    Overview     3/2017-LAD- synergy 3x28 distal and mid post 3.5. CX-2.5x38 resolute post 3.5         Coronary artery disease involving native coronary artery of native heart (Chronic)    Overview     3/2017 c (LV)-LM- 35%, LAD-95% mid, 75% distal. D1- 60%. CX-95%. RCA-35%         Dyslipidemia (Chronic)    TASHI (obstructive sleep apnea)    Hypertension, essential           Plan:             Return to clinic 9 months   Aerobic exercise 5x's/wk. Heart healthy diet and risk factor modification.    See labs and med orders.  Lipid today call results repeat next visit    Portions of this note may have been created with voice recognition software.  Grammatical, syntax and spelling errors may be inevitable.

## 2020-08-03 DIAGNOSIS — E78.5 DYSLIPIDEMIA: Chronic | ICD-10-CM

## 2020-08-03 DIAGNOSIS — Z95.5 STENTED CORONARY ARTERY: Chronic | ICD-10-CM

## 2020-08-03 DIAGNOSIS — I25.10 CORONARY ARTERY DISEASE INVOLVING NATIVE CORONARY ARTERY OF NATIVE HEART WITHOUT ANGINA PECTORIS: Chronic | ICD-10-CM

## 2020-08-06 RX ORDER — ATORVASTATIN CALCIUM 40 MG/1
40 TABLET, FILM COATED ORAL DAILY
Qty: 90 TABLET | Refills: 4 | Status: SHIPPED | OUTPATIENT
Start: 2020-08-06 | End: 2021-10-28

## 2020-08-19 DIAGNOSIS — Z01.84 ANTIBODY RESPONSE EXAMINATION: ICD-10-CM

## 2020-09-04 ENCOUNTER — PATIENT OUTREACH (OUTPATIENT)
Dept: ADMINISTRATIVE | Facility: HOSPITAL | Age: 60
End: 2020-09-04

## 2020-09-04 DIAGNOSIS — Z12.11 SCREENING FOR COLON CANCER: Primary | ICD-10-CM

## 2020-09-18 DIAGNOSIS — Z01.84 ANTIBODY RESPONSE EXAMINATION: ICD-10-CM

## 2020-09-29 ENCOUNTER — PATIENT MESSAGE (OUTPATIENT)
Dept: OTHER | Facility: OTHER | Age: 60
End: 2020-09-29

## 2020-10-05 ENCOUNTER — PATIENT MESSAGE (OUTPATIENT)
Dept: ADMINISTRATIVE | Facility: HOSPITAL | Age: 60
End: 2020-10-05

## 2020-10-18 DIAGNOSIS — Z01.84 ANTIBODY RESPONSE EXAMINATION: ICD-10-CM

## 2020-11-17 DIAGNOSIS — Z01.84 ANTIBODY RESPONSE EXAMINATION: ICD-10-CM

## 2020-11-19 ENCOUNTER — IMMUNIZATION (OUTPATIENT)
Dept: PHARMACY | Facility: CLINIC | Age: 60
End: 2020-11-19
Payer: COMMERCIAL

## 2020-12-11 ENCOUNTER — PATIENT MESSAGE (OUTPATIENT)
Dept: OTHER | Facility: OTHER | Age: 60
End: 2020-12-11

## 2021-01-04 ENCOUNTER — PATIENT MESSAGE (OUTPATIENT)
Dept: ADMINISTRATIVE | Facility: HOSPITAL | Age: 61
End: 2021-01-04

## 2021-01-18 DIAGNOSIS — E78.5 DYSLIPIDEMIA: Chronic | ICD-10-CM

## 2021-01-18 DIAGNOSIS — I25.10 CORONARY ARTERY DISEASE INVOLVING NATIVE CORONARY ARTERY OF NATIVE HEART WITHOUT ANGINA PECTORIS: Chronic | ICD-10-CM

## 2021-01-18 DIAGNOSIS — Z95.5 STENTED CORONARY ARTERY: Chronic | ICD-10-CM

## 2021-01-19 RX ORDER — CLOPIDOGREL BISULFATE 75 MG/1
75 TABLET ORAL DAILY
Qty: 90 TABLET | Refills: 4 | Status: SHIPPED | OUTPATIENT
Start: 2021-01-19 | End: 2021-03-25 | Stop reason: SDUPTHER

## 2021-01-19 RX ORDER — NITROGLYCERIN 0.4 MG/1
0.4 TABLET SUBLINGUAL EVERY 5 MIN PRN
Qty: 25 TABLET | Refills: 4 | Status: SHIPPED | OUTPATIENT
Start: 2021-01-19

## 2021-03-25 DIAGNOSIS — Z95.5 STENTED CORONARY ARTERY: Chronic | ICD-10-CM

## 2021-03-25 DIAGNOSIS — E78.5 DYSLIPIDEMIA: Chronic | ICD-10-CM

## 2021-03-25 DIAGNOSIS — I25.10 CORONARY ARTERY DISEASE INVOLVING NATIVE CORONARY ARTERY OF NATIVE HEART WITHOUT ANGINA PECTORIS: Chronic | ICD-10-CM

## 2021-03-25 RX ORDER — CLOPIDOGREL BISULFATE 75 MG/1
75 TABLET ORAL DAILY
Qty: 90 TABLET | Refills: 4 | Status: SHIPPED | OUTPATIENT
Start: 2021-03-25 | End: 2022-05-10

## 2021-04-05 ENCOUNTER — PATIENT MESSAGE (OUTPATIENT)
Dept: ADMINISTRATIVE | Facility: HOSPITAL | Age: 61
End: 2021-04-05

## 2021-07-06 ENCOUNTER — PATIENT MESSAGE (OUTPATIENT)
Dept: ADMINISTRATIVE | Facility: HOSPITAL | Age: 61
End: 2021-07-06

## 2021-08-27 ENCOUNTER — IMMUNIZATION (OUTPATIENT)
Dept: FAMILY MEDICINE | Facility: CLINIC | Age: 61
End: 2021-08-27
Payer: COMMERCIAL

## 2021-08-27 DIAGNOSIS — Z23 NEED FOR VACCINATION: Primary | ICD-10-CM

## 2021-08-27 PROCEDURE — 0001A COVID-19, MRNA, LNP-S, PF, 30 MCG/0.3 ML DOSE VACCINE: ICD-10-PCS | Mod: CV19,,, | Performed by: FAMILY MEDICINE

## 2021-08-27 PROCEDURE — 91300 COVID-19, MRNA, LNP-S, PF, 30 MCG/0.3 ML DOSE VACCINE: CPT | Mod: ,,, | Performed by: FAMILY MEDICINE

## 2021-08-27 PROCEDURE — 91300 COVID-19, MRNA, LNP-S, PF, 30 MCG/0.3 ML DOSE VACCINE: ICD-10-PCS | Mod: ,,, | Performed by: FAMILY MEDICINE

## 2021-08-27 PROCEDURE — 0001A COVID-19, MRNA, LNP-S, PF, 30 MCG/0.3 ML DOSE VACCINE: CPT | Mod: CV19,,, | Performed by: FAMILY MEDICINE

## 2021-09-17 ENCOUNTER — IMMUNIZATION (OUTPATIENT)
Dept: FAMILY MEDICINE | Facility: CLINIC | Age: 61
End: 2021-09-17
Payer: COMMERCIAL

## 2021-09-17 DIAGNOSIS — Z23 NEED FOR VACCINATION: Primary | ICD-10-CM

## 2021-09-17 PROCEDURE — 0002A COVID-19, MRNA, LNP-S, PF, 30 MCG/0.3 ML DOSE VACCINE: ICD-10-PCS | Mod: CV19,,, | Performed by: FAMILY MEDICINE

## 2021-09-17 PROCEDURE — 91300 COVID-19, MRNA, LNP-S, PF, 30 MCG/0.3 ML DOSE VACCINE: ICD-10-PCS | Mod: ,,, | Performed by: FAMILY MEDICINE

## 2021-09-17 PROCEDURE — 0002A COVID-19, MRNA, LNP-S, PF, 30 MCG/0.3 ML DOSE VACCINE: CPT | Mod: CV19,,, | Performed by: FAMILY MEDICINE

## 2021-09-17 PROCEDURE — 91300 COVID-19, MRNA, LNP-S, PF, 30 MCG/0.3 ML DOSE VACCINE: CPT | Mod: ,,, | Performed by: FAMILY MEDICINE

## 2021-10-04 ENCOUNTER — PATIENT MESSAGE (OUTPATIENT)
Dept: ADMINISTRATIVE | Facility: HOSPITAL | Age: 61
End: 2021-10-04

## 2021-10-28 DIAGNOSIS — I25.10 CORONARY ARTERY DISEASE INVOLVING NATIVE CORONARY ARTERY OF NATIVE HEART WITHOUT ANGINA PECTORIS: Chronic | ICD-10-CM

## 2021-10-28 DIAGNOSIS — E78.5 DYSLIPIDEMIA: Chronic | ICD-10-CM

## 2021-10-28 DIAGNOSIS — Z95.5 STENTED CORONARY ARTERY: Chronic | ICD-10-CM

## 2021-10-28 RX ORDER — ATORVASTATIN CALCIUM 40 MG/1
TABLET, FILM COATED ORAL
Qty: 90 TABLET | Refills: 4 | Status: SHIPPED | OUTPATIENT
Start: 2021-10-28 | End: 2023-01-18 | Stop reason: SDUPTHER

## 2022-03-11 ENCOUNTER — CLINICAL SUPPORT (OUTPATIENT)
Dept: AUDIOLOGY | Facility: CLINIC | Age: 62
End: 2022-03-11
Payer: COMMERCIAL

## 2022-03-11 ENCOUNTER — OFFICE VISIT (OUTPATIENT)
Dept: OTOLARYNGOLOGY | Facility: CLINIC | Age: 62
End: 2022-03-11
Payer: COMMERCIAL

## 2022-03-11 VITALS — TEMPERATURE: 99 F | WEIGHT: 220.44 LBS | HEIGHT: 68 IN | BODY MASS INDEX: 33.41 KG/M2

## 2022-03-11 DIAGNOSIS — H93.13 TINNITUS, BILATERAL: Primary | ICD-10-CM

## 2022-03-11 DIAGNOSIS — H91.90 PERCEIVED HEARING CHANGES: ICD-10-CM

## 2022-03-11 DIAGNOSIS — H91.90 PERCEIVED HEARING CHANGES: Primary | ICD-10-CM

## 2022-03-11 PROCEDURE — 1159F PR MEDICATION LIST DOCUMENTED IN MEDICAL RECORD: ICD-10-PCS | Mod: CPTII,S$GLB,, | Performed by: NURSE PRACTITIONER

## 2022-03-11 PROCEDURE — 4010F ACE/ARB THERAPY RXD/TAKEN: CPT | Mod: CPTII,S$GLB,, | Performed by: NURSE PRACTITIONER

## 2022-03-11 PROCEDURE — 99203 OFFICE O/P NEW LOW 30 MIN: CPT | Mod: S$GLB,,, | Performed by: NURSE PRACTITIONER

## 2022-03-11 PROCEDURE — 99999 PR PBB SHADOW E&M-EST. PATIENT-LVL III: CPT | Mod: PBBFAC,,, | Performed by: NURSE PRACTITIONER

## 2022-03-11 PROCEDURE — 99999 PR PBB SHADOW E&M-EST. PATIENT-LVL III: ICD-10-PCS | Mod: PBBFAC,,, | Performed by: NURSE PRACTITIONER

## 2022-03-11 PROCEDURE — 92567 TYMPANOMETRY: CPT | Mod: S$GLB,,, | Performed by: AUDIOLOGIST

## 2022-03-11 PROCEDURE — 1160F PR REVIEW ALL MEDS BY PRESCRIBER/CLIN PHARMACIST DOCUMENTED: ICD-10-PCS | Mod: CPTII,S$GLB,, | Performed by: NURSE PRACTITIONER

## 2022-03-11 PROCEDURE — 99999 PR PBB SHADOW E&M-EST. PATIENT-LVL I: CPT | Mod: PBBFAC,,,

## 2022-03-11 PROCEDURE — 1159F MED LIST DOCD IN RCRD: CPT | Mod: CPTII,S$GLB,, | Performed by: NURSE PRACTITIONER

## 2022-03-11 PROCEDURE — 3008F BODY MASS INDEX DOCD: CPT | Mod: CPTII,S$GLB,, | Performed by: NURSE PRACTITIONER

## 2022-03-11 PROCEDURE — 99203 PR OFFICE/OUTPT VISIT, NEW, LEVL III, 30-44 MIN: ICD-10-PCS | Mod: S$GLB,,, | Performed by: NURSE PRACTITIONER

## 2022-03-11 PROCEDURE — 99999 PR PBB SHADOW E&M-EST. PATIENT-LVL I: ICD-10-PCS | Mod: PBBFAC,,,

## 2022-03-11 PROCEDURE — 4010F PR ACE/ARB THEARPY RXD/TAKEN: ICD-10-PCS | Mod: CPTII,S$GLB,, | Performed by: NURSE PRACTITIONER

## 2022-03-11 PROCEDURE — 1160F RVW MEDS BY RX/DR IN RCRD: CPT | Mod: CPTII,S$GLB,, | Performed by: NURSE PRACTITIONER

## 2022-03-11 PROCEDURE — 3008F PR BODY MASS INDEX (BMI) DOCUMENTED: ICD-10-PCS | Mod: CPTII,S$GLB,, | Performed by: NURSE PRACTITIONER

## 2022-03-11 PROCEDURE — 92567 PR TYMPA2METRY: ICD-10-PCS | Mod: S$GLB,,, | Performed by: AUDIOLOGIST

## 2022-03-11 NOTE — PROGRESS NOTES
Pt referred to Audiology for tympanometry by Radha Yadav in ENT due to intermittent sudden decreases in hearing in his right ear with return to baseline each time.     Type A tympanograms obtained bilaterally.     Pt informed that if he needed a full audiogram that he could be worked in this morning.  ENT to f/u and determine if full audiogram needed.

## 2022-03-11 NOTE — PROGRESS NOTES
Subjective:       Patient ID: Niraj Diaz is a 62 y.o. male.    Chief Complaint: No chief complaint on file.    HPI   Patient is new to ENT, self-referred for ear wax. Patient reports right ear with temporary sudden hearing loss 2 weeks ago that only lasted a few hours. Then happened again 2 days ago, sudden hearing loss AD, resolved after several hours. Patient reports hearing is back to baseline at this time. Patient reports tinnitus AU X 15 years.     Review of Systems   Constitutional: Negative.    HENT: Positive for hearing loss and tinnitus.    Eyes: Negative.    Respiratory: Negative.    Cardiovascular: Negative.    Gastrointestinal: Negative.    Musculoskeletal: Negative.    Integumentary:  Negative.   Neurological: Negative.    Hematological: Negative.    Psychiatric/Behavioral: Negative.          Objective:      Physical Exam  Vitals and nursing note reviewed.   Constitutional:       General: He is not in acute distress.     Appearance: He is well-developed. He is not ill-appearing or diaphoretic.   HENT:      Head: Normocephalic and atraumatic.      Right Ear: Hearing, tympanic membrane, ear canal and external ear normal. No middle ear effusion. Tympanic membrane is not erythematous. Tympanic membrane has normal mobility.      Left Ear: Hearing, tympanic membrane, ear canal and external ear normal.  No middle ear effusion. Tympanic membrane is not erythematous. Tympanic membrane has normal mobility.      Ears:      Alfonso exam findings: lateralizes left.     Right Rinne: AC > BC.     Left Rinne: AC > BC.     Nose: Nose normal.      Mouth/Throat:      Pharynx: Uvula midline.   Eyes:      General: Lids are normal. No scleral icterus.        Right eye: No discharge.         Left eye: No discharge.   Neck:      Trachea: Trachea normal. No tracheal deviation.   Cardiovascular:      Rate and Rhythm: Normal rate.   Pulmonary:      Effort: Pulmonary effort is normal. No respiratory distress.      Breath sounds:  No stridor. No wheezing.   Musculoskeletal:         General: Normal range of motion.      Cervical back: Normal range of motion and neck supple.   Skin:     General: Skin is warm and dry.      Coloration: Skin is not pale.   Neurological:      Mental Status: He is alert and oriented to person, place, and time.      Coordination: Coordination normal.      Gait: Gait normal.   Psychiatric:         Speech: Speech normal.         Behavior: Behavior normal. Behavior is cooperative.         Thought Content: Thought content normal.         Judgment: Judgment normal.         Assessment:       Problem List Items Addressed This Visit    None     Visit Diagnoses     Tinnitus, bilateral    -  Primary    Perceived hearing changes              Plan:     Patient deferred hearing test at this time. He states his hearing returns to baseline within a few hours of each event of right-sided sudden onset hearing loss.     Encouraged to return for audiogram at his convenience.  Reassured negative aural exam (no wax, no middle ear fluid).   Patient encouraged to return to clinic if symptoms worsen/persist and as needed for further ENT symptoms or concerns.

## 2022-05-10 DIAGNOSIS — Z95.5 STENTED CORONARY ARTERY: Chronic | ICD-10-CM

## 2022-05-10 DIAGNOSIS — I25.10 CORONARY ARTERY DISEASE INVOLVING NATIVE CORONARY ARTERY OF NATIVE HEART WITHOUT ANGINA PECTORIS: Chronic | ICD-10-CM

## 2022-05-10 DIAGNOSIS — E78.5 DYSLIPIDEMIA: Chronic | ICD-10-CM

## 2022-05-10 RX ORDER — CLOPIDOGREL BISULFATE 75 MG/1
TABLET ORAL
Qty: 90 TABLET | Refills: 4 | Status: SHIPPED | OUTPATIENT
Start: 2022-05-10 | End: 2023-05-11

## 2022-06-08 ENCOUNTER — TELEPHONE (OUTPATIENT)
Dept: CARDIOLOGY | Facility: CLINIC | Age: 62
End: 2022-06-08
Payer: COMMERCIAL

## 2022-06-08 NOTE — TELEPHONE ENCOUNTER
----- Message from Spike Castelan sent at 6/8/2022  8:12 AM CDT -----  Contact: Spouse/Aure  Type: Needs Medical Advice  Who Called:  Spouse/Aure  Best Call Back Number: [phone]  Additional Information: States pt was admitted to/discharged from [facility] with a mild stroke. Called to speak with staff.

## 2022-06-09 ENCOUNTER — TELEPHONE (OUTPATIENT)
Dept: CARDIOLOGY | Facility: CLINIC | Age: 62
End: 2022-06-09
Payer: COMMERCIAL

## 2022-06-09 NOTE — TELEPHONE ENCOUNTER
----- Message from Renee Perez sent at 6/9/2022  8:08 AM CDT -----  Contact: [phone]  Type:  Sooner Appointment Request    Caller is requesting a sooner appointment.  Caller declined first available appointment listed below.  Caller will not accept being placed on the waitlist and is requesting a message be sent to doctor.    Name of Caller: Pts wife   When is the first available appointment?  7/15  Symptoms:  Hospital fu/ Mini Stroke  Best Call Back Number:  [phone]  Additional Information:  Pt is requesting to come in tomorrow if possible. Pt took his BP Medication today for the first time since discharge. Discharged 6/7. Pls call back and advise

## 2022-06-13 ENCOUNTER — OFFICE VISIT (OUTPATIENT)
Dept: CARDIOLOGY | Facility: CLINIC | Age: 62
End: 2022-06-13
Payer: COMMERCIAL

## 2022-06-13 VITALS
BODY MASS INDEX: 35.55 KG/M2 | WEIGHT: 234.56 LBS | HEART RATE: 80 BPM | SYSTOLIC BLOOD PRESSURE: 155 MMHG | DIASTOLIC BLOOD PRESSURE: 99 MMHG | HEIGHT: 68 IN

## 2022-06-13 DIAGNOSIS — I10 HYPERTENSION, ESSENTIAL: ICD-10-CM

## 2022-06-13 DIAGNOSIS — I25.10 CORONARY ARTERY DISEASE INVOLVING NATIVE CORONARY ARTERY OF NATIVE HEART WITHOUT ANGINA PECTORIS: Chronic | ICD-10-CM

## 2022-06-13 DIAGNOSIS — G45.9 TIA (TRANSIENT ISCHEMIC ATTACK): ICD-10-CM

## 2022-06-13 DIAGNOSIS — G47.33 OSA (OBSTRUCTIVE SLEEP APNEA): ICD-10-CM

## 2022-06-13 DIAGNOSIS — Z95.5 STENTED CORONARY ARTERY: Primary | Chronic | ICD-10-CM

## 2022-06-13 DIAGNOSIS — E78.5 DYSLIPIDEMIA: Chronic | ICD-10-CM

## 2022-06-13 PROCEDURE — 1159F PR MEDICATION LIST DOCUMENTED IN MEDICAL RECORD: ICD-10-PCS | Mod: CPTII,S$GLB,, | Performed by: INTERNAL MEDICINE

## 2022-06-13 PROCEDURE — 99215 PR OFFICE/OUTPT VISIT, EST, LEVL V, 40-54 MIN: ICD-10-PCS | Mod: S$GLB,,, | Performed by: INTERNAL MEDICINE

## 2022-06-13 PROCEDURE — 1160F RVW MEDS BY RX/DR IN RCRD: CPT | Mod: CPTII,S$GLB,, | Performed by: INTERNAL MEDICINE

## 2022-06-13 PROCEDURE — 4010F PR ACE/ARB THEARPY RXD/TAKEN: ICD-10-PCS | Mod: CPTII,S$GLB,, | Performed by: INTERNAL MEDICINE

## 2022-06-13 PROCEDURE — 3077F PR MOST RECENT SYSTOLIC BLOOD PRESSURE >= 140 MM HG: ICD-10-PCS | Mod: CPTII,S$GLB,, | Performed by: INTERNAL MEDICINE

## 2022-06-13 PROCEDURE — 3008F PR BODY MASS INDEX (BMI) DOCUMENTED: ICD-10-PCS | Mod: CPTII,S$GLB,, | Performed by: INTERNAL MEDICINE

## 2022-06-13 PROCEDURE — 3008F BODY MASS INDEX DOCD: CPT | Mod: CPTII,S$GLB,, | Performed by: INTERNAL MEDICINE

## 2022-06-13 PROCEDURE — 1160F PR REVIEW ALL MEDS BY PRESCRIBER/CLIN PHARMACIST DOCUMENTED: ICD-10-PCS | Mod: CPTII,S$GLB,, | Performed by: INTERNAL MEDICINE

## 2022-06-13 PROCEDURE — 4010F ACE/ARB THERAPY RXD/TAKEN: CPT | Mod: CPTII,S$GLB,, | Performed by: INTERNAL MEDICINE

## 2022-06-13 PROCEDURE — 1159F MED LIST DOCD IN RCRD: CPT | Mod: CPTII,S$GLB,, | Performed by: INTERNAL MEDICINE

## 2022-06-13 PROCEDURE — 99999 PR PBB SHADOW E&M-EST. PATIENT-LVL III: ICD-10-PCS | Mod: PBBFAC,,, | Performed by: INTERNAL MEDICINE

## 2022-06-13 PROCEDURE — 99999 PR PBB SHADOW E&M-EST. PATIENT-LVL III: CPT | Mod: PBBFAC,,, | Performed by: INTERNAL MEDICINE

## 2022-06-13 PROCEDURE — 99215 OFFICE O/P EST HI 40 MIN: CPT | Mod: S$GLB,,, | Performed by: INTERNAL MEDICINE

## 2022-06-13 PROCEDURE — 3080F PR MOST RECENT DIASTOLIC BLOOD PRESSURE >= 90 MM HG: ICD-10-PCS | Mod: CPTII,S$GLB,, | Performed by: INTERNAL MEDICINE

## 2022-06-13 PROCEDURE — 3077F SYST BP >= 140 MM HG: CPT | Mod: CPTII,S$GLB,, | Performed by: INTERNAL MEDICINE

## 2022-06-13 PROCEDURE — 3080F DIAST BP >= 90 MM HG: CPT | Mod: CPTII,S$GLB,, | Performed by: INTERNAL MEDICINE

## 2022-06-13 RX ORDER — ASPIRIN 325 MG
325 TABLET ORAL DAILY
COMMUNITY

## 2022-06-13 NOTE — PROGRESS NOTES
Subjective:    Patient ID:  Niraj Diaz is a 62 y.o. male patient here for evaluation CAD F/U      History of Present Illness:  CARDIOLOGY FOLLOW-UP.  PRIOR HISTORY OF CORONARY DISEASE STATUS POST REMOTE PCI STENT.  HISTORY OF ACS AT THE TIME OF PCI STENT PROCEDURE 20/17.  NO NEED FOLLOW-UP NONINVASIVE CARDIAC ASSESSMENT.    LAST NONINVASIVE CARDIAC ASSESSMENT 20/19 WITH NEGATIVE NUCLEAR STUDY IN UNREMARKABLE ECHO.    RECENT HOSPITALIZATION FOR TIA/CVA.  ABNORMAL MRI.  PATIENT INSTRUCTED FOLLOW-UP WITH CARDIOLOGY AND NEUROLOGY.  NEUROLOGIC DEFICIT TOTALLY RESOLVED.    NO PRIOR HISTORY OF ARRHYTHMIC HEART DISEASE STRUCTURAL HEART ISSUES.  ACCORDING TO THE HISTORY AND BUBBLE CONTRAST STUDY WHICH WAS NEGATIVE FOR SHUNT, AGAIN RECORDS PENDING REVIEW.             Review of patient's allergies indicates:  No Known Allergies    Past Medical History:   Diagnosis Date    Bulging lumbar disc     Hypertension      Past Surgical History:   Procedure Laterality Date    VASECTOMY       Social History     Tobacco Use    Smoking status: Never Smoker    Smokeless tobacco: Never Used   Substance Use Topics    Alcohol use: No    Drug use: No        Review of Systems:    As noted in HPI in addition      REVIEW OF SYSTEMS  Review of Systems   Constitutional: Negative for decreased appetite, diaphoresis, night sweats, weight gain and weight loss.   HENT: Negative for nosebleeds and odynophagia.    Eyes: Negative for double vision and photophobia.   Cardiovascular: Negative for chest pain, claudication, cyanosis, dyspnea on exertion, irregular heartbeat, leg swelling, near-syncope, orthopnea, palpitations, paroxysmal nocturnal dyspnea and syncope.   Respiratory: Negative for cough, hemoptysis, shortness of breath and wheezing.    Hematologic/Lymphatic: Negative for adenopathy.   Skin: Negative for flushing, skin cancer and suspicious lesions.   Musculoskeletal: Negative for gout, myalgias and neck pain.   Gastrointestinal:  Negative for abdominal pain, heartburn, hematemesis and hematochezia.   Genitourinary: Negative for bladder incontinence, hesitancy and nocturia.   Neurological: Negative for focal weakness, headaches, light-headedness and paresthesias.        Recent TIA/CVA   Psychiatric/Behavioral: Negative for memory loss and substance abuse.              Objective:        Vitals:    06/13/22 1026   BP: (!) 155/99   Pulse: 80       Lab Results   Component Value Date    WBC 5.86 07/05/2020    HGB 16.5 07/05/2020    HCT 46.7 07/05/2020     07/05/2020    CHOL 133 06/07/2022    TRIG 88 06/07/2022    HDL 48 06/07/2022    ALT 33 07/05/2020    AST 38 07/05/2020     07/05/2020    K 4.2 07/05/2020     07/05/2020    CREATININE 0.72 07/05/2020    BUN 7 (L) 07/05/2020    CO2 26 07/05/2020    TSH 1.200 10/29/2013    PSA 0.47 01/14/2009    HGBA1C 5.7 06/07/2022        ECHOCARDIOGRAM RESULTS  No results found for this or any previous visit.        CURRENT/PREVIOUS VISIT EKG  Results for orders placed or performed during the hospital encounter of 07/05/20   EKG 12-lead    Collection Time: 07/05/20 11:18 AM    Narrative    Test Reason : R06.02,    Vent. Rate : 098 BPM     Atrial Rate : 098 BPM     P-R Int : 178 ms          QRS Dur : 096 ms      QT Int : 354 ms       P-R-T Axes : 022 070 018 degrees     QTc Int : 451 ms    Normal sinus rhythm  Incomplete right bundle branch block  Borderline Abnormal ECG  When compared with ECG of 13-OCT-2017 09:41,  Previous ECG has undetermined rhythm, needs review  QT has lengthened  Confirmed by Nikos NEFF, Colin WOLFF ([id number]) on 7/6/2020 1:57:25 PM    Referred By: WILLIEERR   SELF           Confirmed By:Colin Knott MD     No valid procedures specified.   No results found for this or any previous visit.    No valid procedures specified.    PHYSICAL EXAM  CONSTITUTIONAL: Well built, well nourished in no apparent distress  NECK: no carotid bruit, no JVD  LUNGS: CTA  CHEST WALL: no  tenderness,  HEART: regular rate and rhythm, S1, S2 normal, no murmur, click, rub or gallop   ABDOMEN: soft, non-tender; bowel sounds normal; no masses,  no organomegaly  EXTREMITIES: Extremities normal, no edema, no calf tenderness noted  NEURO: AAO X 3    I HAVE REVIEWED :    The vital signs, nurses notes, and all the pertinent radiology and labs.         Current Outpatient Medications   Medication Instructions    aspirin 325 mg, Oral, Daily    atorvastatin (LIPITOR) 40 MG tablet TAKE 1 TABLET BY MOUTH EVERY DAY    clopidogreL (PLAVIX) 75 mg tablet TAKE 1 TABLET BY MOUTH EVERY DAY    metoprolol succinate (TOPROL-XL) 50 MG 24 hr tablet TAKE 1 TABLET BY MOUTH EVERY DAY    nitroGLYCERIN (NITROSTAT) 0.4 mg, Sublingual, Every 5 min PRN    traMADoL (ULTRAM)  mg, Oral, 2 times daily PRN    valsartan (DIOVAN) 80 MG tablet TAKE 1 TABLET BY MOUTH EVERY DAY          Assessment:   RECENT TIA/CVA.  ABNORMAL MRI.  NEUROLOGY CONSULT PENDING.  CAD.  REMOTE HISTORY OF PCI STENT, ACS.  LAST NONINVASIVE CARDIAC ASSESSMENT-20/17.  HYPERTENSION.  DYSLIPIDEMIA.        Plan:   OBTAIN RESULTS OF NEUROLOGIC WORKUP THIS PAST HOSPITALIZATION.  FOLLOW-UP PER NEUROLOGY.    FOUR MONTHS.          No follow-ups on file.

## 2022-06-22 NOTE — PROGRESS NOTES
"NEUROLOGY  Outpatient CONSULT    [facility]  [address], [place] [address]  [phone] (office) / [phone] (fax)    Patient Name:  Niraj Diaz  :  1960  MR #:  [id number]  Acct #:  [id number]    Date of Neurology Consult: 2022  Name of Provider: JENIFER Berrios    Other Physicians:  Alberto Jimenez MD (Primary Care Physician); Gregorio Maravilla MD (Referring)      Chief Complaint: Transient Ischemic Attack (2022)      History of Present Illness (HPI):  Niraj Diaz is a right handed 62 y.o. male with a PMHx of HTN, Stents  Patient is here today for hospital follow up. On  he suddenly developed left arm weakness. He could not hold up his arm. There were no other associated symptoms. He immediately went to the ED. His symptoms resolved prior to arriving at the ED. He was taking Plavix daily but admits to not taking aspirin consistently. He is now maintained on Aspirin and Plavix daily.   Since discharged he has been feeling well and denies any further symptoms. All neurological testing discussed at length.          As per Care Everywhere   2022   "History of Present Illness   Mr. Diaz is a 62-year-old male with a past medical history of hypertension, hyperlipidemia, coronary artery disease status post PCI currently on DAPT who presents to the  emergency department with complaints of left arm weakness. Patient states he was at home working on his iPad when his left arm suddenly became weak. He states that he was  unable to lift any objects or hold his arm up against gravity. He immediately presented to the emergency department. His symptoms resolved after 15 minutes and by time he arrived,  he had no symptoms. He expressed no other complaints."          Past Medical, Surgical, Family & Social History:   Past Medical History:   Diagnosis Date    Bulging lumbar disc     Hypertension      Past Surgical History:   Procedure Laterality " Date    VASECTOMY       History reviewed. No pertinent family history.  Alcohol use:  reports no history of alcohol use.   (Of note, 0.6 oz = 1 beer or 6 oz = 10 beers).  Tobacco use:  reports that he has never smoked. He has never used smokeless tobacco.  Street drug use:  reports no history of drug use.  Allergies: Patient has no known allergies..    Home Medications:     Current Outpatient Medications:     aspirin 325 MG tablet, Take 325 mg by mouth once daily., Disp: , Rfl:     atorvastatin (LIPITOR) 40 MG tablet, TAKE 1 TABLET BY MOUTH EVERY DAY, Disp: 90 tablet, Rfl: 4    clopidogreL (PLAVIX) 75 mg tablet, TAKE 1 TABLET BY MOUTH EVERY DAY, Disp: 90 tablet, Rfl: 4    metoprolol succinate (TOPROL-XL) 50 MG 24 hr tablet, TAKE 1 TABLET BY MOUTH EVERY DAY, Disp: 90 tablet, Rfl: 3    nitroGLYCERIN (NITROSTAT) 0.4 MG SL tablet, Place 1 tablet (0.4 mg total) under the tongue every 5 (five) minutes as needed for Chest pain., Disp: 25 tablet, Rfl: 4    tramadol (ULTRAM) 50 mg tablet, Take 1-2 tablets ( mg total) by mouth 2 (two) times daily as needed for Pain., Disp: 60 tablet, Rfl: 01    valsartan (DIOVAN) 80 MG tablet, TAKE 1 TABLET BY MOUTH EVERY DAY, Disp: 90 tablet, Rfl: 4  No current facility-administered medications for this visit.    Physical Examination:  BP (!) 139/91 (BP Location: Left arm, Patient Position: Sitting, BP Method: Medium (Automatic))   Pulse 75   Resp 18   Wt 106.8 kg (235 lb 9 oz)   BMI 35.82 kg/m²     GENERAL:  General appearance: Well, non-toxic appearing.  No apparent distress.  Neck: supple.  .    MENTAL STATUS:  Alertness, attention span & concentration: normal.  Language: normal.  Orientation to self, place & time:  normal.  Memory, recent & remote: normal.  Fund of knowledge: normal.      SPEECH:  Clear and fluent.  Follows complex commands.      CRANIAL NERVES:  Cranial Nerves II-XII were examined.  II - Visual fields: normal.  III, IV, VI: PERRL, EOMI, No ptosis, No  nystagmus.  V - Facial sensation: normal.  VII - Face symmetry & mobility: normal.  VIII - Hearing: normal  IX, X - Palate: mobile & midline.  XI - Shoulder shrug: normal.  XII - Tongue protrusion: normal.        GROSS MOTOR:  Gait & station: non focal  Tone: normal.  Abnormal movements: none.  Finger-nose: normal.  Rapid alternating movements: normal.  Pronator drift: normal      MUSCLE STRENGTH:     Fascics Atrophy RIGHT    LEFT Atrophy Fascics     5 Deltoids 5       5 Sh.Ext.Rot. 5       5 Sh.Int.Rot. 5       5 Biceps 5       5 Triceps 5       5 Forearm.Pr. 5                5 Iliopsoas flex    5       5 Hip Abduct 5       5 Hip Adduct 5       5 Quads 5       5 Hams 5       5 Dorsiflex 5       5 Plantar Flex 5       5 Ankle Brendan 5       5 Ankle Invert 5       REFLEXES:    RIGHT Reflex   LEFT   1 Biceps 1   1 Brachiorad. 1        1 Patellar 1     SENSORY:  Light touch: Normal throughout.           Diagnostic Data Reviewed:   A1c 5.7  LDL 67    CTA H&N 6/6/2022 (outsside media):  HEAD FINDINGS: No large vessel occlusion, significant stenosis, aneurysm, or vascular malformation. No focal cortical oligemia. Mild carotid siphon atherosclerotic disease.     NECK FINDINGS: Moderate mixed calcified and noncalcified atherosclerotic disease noted within the bilateral, right greater left, carotid bifurcation/proximal internal carotid arteries resulting in less than 50% stenosis by NASCET criteria. There is   moderate tortuosity of the distal extracranial ICAs bilaterally. The vertebral and basilar arteries are patent without significant stenosis. The subclavian arteries are patent without significant stenosis. Millimetric calcified granuloma identified   within the otherwise unremarkable bilateral lung apices. Evaluation of the internal carotid arteries for determining clinically significant stenosis was performed by comparing the diameters of the proximal and distal internal carotid arteries.     IMPRESSION:    1.  No  large vessel occlusion.    2.  Less than 50% bilateral ICA stenosis by NASCET criteria.   3. Additional incidental, age-related, and/or chronic findings as described above.       MRI brain 6/7/2022 (outside media):  FINDINGS: The ventricles are normal in size and position.  A punctate focus of T2 hypertense signal and restricted diffusion is visualized within the right posterior frontal subcortical white matter and precentral gyrus suggestive of an early subacute infarct. There is no evidence of acute intracranial hemorrhage.  There is no evidence of mass, mass effect, or midline shift.  T2 hypertense signal is present in the periventricular white matter.  The visualized orbits are normal in appearance.  Minimal mucosal thickening is present in ethmoid and maxillary sinuses.  Normal flow voids are present.     IMPRESSION:    1.  Punctate early subacute infarct of the right posterior frontal subcortical white matter and precentral gyrus.      2.  Mild chronic microvascular ischemic disease.       TTE 6/7/2022 (outside media):  LEFT VENTRICLE        o The left ventricle is normal in size.      o Ejection Fraction = 60-65%.      o There is normal left ventricular wall thickness.      o No regional wall motion abnormalities noted.     DIASTOLOGY        o Lateral e'= '10' cm/s.      o Septal e'= '8' cm/s.      o E/e' ='10'.      o LAEDV index = '26' ml/m2.      o Normal Diastolic function.     RIGHT VENTRICLE        o The right ventricle is normal in size and function.     ATRIA        o The left atrium is mildly dilated.      o IVC diameter < 2.1cm with > 50% collapse.      o Bubble Study 'Negative'.     MITRAL VALVE        o There is mild mitral annular calcification.      o There is no mitral regurgitation noted.      o There is no mitral valve stenosis.     TRICUSPID VALVE        o The tricuspid valve is not well visualized, but is grossly normal.      o No tricuspid regurgitation.      o There is no tricuspid  stenosis.     AORTIC VALVE        o Aortic Valve Grossly Normal.      o No aortic regurgitation is present.      o There is no aortic valvular stenosis.      o AV MAX PG 6.8 mmHg.     PULMONIC VALVE        o Pulmonary valve not well visualized.     GREAT VESSELS        o The aortic root is normal size.      o The pulmonary artery is not well visualized.     PERICARDIUM/PLEURAL EFFUSION        o There is no pericardial effusion.      o There is no pleural effusion.          Assessment and Plan:  Niraj Diaz is a 62 y.o. male.      Problem List Items Addressed This Visit        Neuro    Small vessel stroke - Primary    Current Assessment & Plan     Patient is a 63 y/o male that presents following a recent hospital stay at [facility].   He suddenly developed left arm weakness that lasted ~ 15 mins and resolved prior to ED arrival.   MRI Brain reports punctate subacute right posterior frontal   - unable to view imaging  CTA without LVO  TTE neg for PFO    Pt previously taking Plavix QD for stents and was not consistent with daily aspirin.   - recommend DAPT    - Check Plavix resistance marker    Control stroke risk factors:  BP management as per PCP  Cont statin; LDL at goal  A1c at goal  Diet modification    Stroke education provided              Cardiac/Vascular    Dyslipidemia (Chronic)    Current Assessment & Plan     Stroke risk factor  Cont statin   LDL at goal           Hypertension, essential    Current Assessment & Plan     Stroke risk factor  BP management as per PCP/Cards                               Important to note, also  has a past medical history of Bulging lumbar disc and Hypertension.            The patient will return to clinic as needed        All questions were answered and patient is comfortable with the plan.       Thank you very much for the opportunity to assist in this patient's care.    If you have any questions or concerns, please do not hesitate to contact me at any time.    Sincerely,      JENIFER Berrios  [facility] [place] I spent a total of 53 minutes on the day of the visit.This includes face to face time and non-face to face time preparing to see the patient (eg, review of tests), Obtaining and/or reviewing separately obtained history, Documenting clinical information in the electronic or other health record, Independently interpreting resultsand communicating results to the patient/family/caregiver, or Care coordination.

## 2022-06-23 ENCOUNTER — OFFICE VISIT (OUTPATIENT)
Dept: NEUROLOGY | Facility: CLINIC | Age: 62
End: 2022-06-23
Payer: COMMERCIAL

## 2022-06-23 ENCOUNTER — LAB VISIT (OUTPATIENT)
Dept: LAB | Facility: HOSPITAL | Age: 62
End: 2022-06-23
Attending: NURSE PRACTITIONER
Payer: COMMERCIAL

## 2022-06-23 VITALS
SYSTOLIC BLOOD PRESSURE: 139 MMHG | DIASTOLIC BLOOD PRESSURE: 91 MMHG | HEART RATE: 75 BPM | BODY MASS INDEX: 35.82 KG/M2 | WEIGHT: 235.56 LBS | RESPIRATION RATE: 18 BRPM

## 2022-06-23 DIAGNOSIS — E78.5 DYSLIPIDEMIA: ICD-10-CM

## 2022-06-23 DIAGNOSIS — I63.9 SMALL VESSEL STROKE: Primary | ICD-10-CM

## 2022-06-23 DIAGNOSIS — I10 HYPERTENSION, ESSENTIAL: ICD-10-CM

## 2022-06-23 DIAGNOSIS — I63.9 SMALL VESSEL STROKE: ICD-10-CM

## 2022-06-23 PROCEDURE — 81225 CYP2C19 GENE COM VARIANTS: CPT | Performed by: NURSE PRACTITIONER

## 2022-06-23 PROCEDURE — 3008F PR BODY MASS INDEX (BMI) DOCUMENTED: ICD-10-PCS | Mod: CPTII,S$GLB,, | Performed by: NURSE PRACTITIONER

## 2022-06-23 PROCEDURE — 3075F SYST BP GE 130 - 139MM HG: CPT | Mod: CPTII,S$GLB,, | Performed by: NURSE PRACTITIONER

## 2022-06-23 PROCEDURE — 3075F PR MOST RECENT SYSTOLIC BLOOD PRESS GE 130-139MM HG: ICD-10-PCS | Mod: CPTII,S$GLB,, | Performed by: NURSE PRACTITIONER

## 2022-06-23 PROCEDURE — 99204 OFFICE O/P NEW MOD 45 MIN: CPT | Mod: S$GLB,,, | Performed by: NURSE PRACTITIONER

## 2022-06-23 PROCEDURE — 1159F MED LIST DOCD IN RCRD: CPT | Mod: CPTII,S$GLB,, | Performed by: NURSE PRACTITIONER

## 2022-06-23 PROCEDURE — 3080F PR MOST RECENT DIASTOLIC BLOOD PRESSURE >= 90 MM HG: ICD-10-PCS | Mod: CPTII,S$GLB,, | Performed by: NURSE PRACTITIONER

## 2022-06-23 PROCEDURE — 99204 PR OFFICE/OUTPT VISIT, NEW, LEVL IV, 45-59 MIN: ICD-10-PCS | Mod: S$GLB,,, | Performed by: NURSE PRACTITIONER

## 2022-06-23 PROCEDURE — 4010F PR ACE/ARB THEARPY RXD/TAKEN: ICD-10-PCS | Mod: CPTII,S$GLB,, | Performed by: NURSE PRACTITIONER

## 2022-06-23 PROCEDURE — 99999 PR PBB SHADOW E&M-EST. PATIENT-LVL IV: CPT | Mod: PBBFAC,,, | Performed by: NURSE PRACTITIONER

## 2022-06-23 PROCEDURE — 3080F DIAST BP >= 90 MM HG: CPT | Mod: CPTII,S$GLB,, | Performed by: NURSE PRACTITIONER

## 2022-06-23 PROCEDURE — 1160F RVW MEDS BY RX/DR IN RCRD: CPT | Mod: CPTII,S$GLB,, | Performed by: NURSE PRACTITIONER

## 2022-06-23 PROCEDURE — 1159F PR MEDICATION LIST DOCUMENTED IN MEDICAL RECORD: ICD-10-PCS | Mod: CPTII,S$GLB,, | Performed by: NURSE PRACTITIONER

## 2022-06-23 PROCEDURE — 1160F PR REVIEW ALL MEDS BY PRESCRIBER/CLIN PHARMACIST DOCUMENTED: ICD-10-PCS | Mod: CPTII,S$GLB,, | Performed by: NURSE PRACTITIONER

## 2022-06-23 PROCEDURE — 3008F BODY MASS INDEX DOCD: CPT | Mod: CPTII,S$GLB,, | Performed by: NURSE PRACTITIONER

## 2022-06-23 PROCEDURE — 4010F ACE/ARB THERAPY RXD/TAKEN: CPT | Mod: CPTII,S$GLB,, | Performed by: NURSE PRACTITIONER

## 2022-06-23 PROCEDURE — 99999 PR PBB SHADOW E&M-EST. PATIENT-LVL IV: ICD-10-PCS | Mod: PBBFAC,,, | Performed by: NURSE PRACTITIONER

## 2022-06-23 NOTE — PATIENT INSTRUCTIONS
You have been diagnosed with a stroke, or with a TIA (transient ischemic attack). Or you have been identified as having a high risk for stroke. During a stroke, blood stops flowing to part of your brain. This can damage areas in the brain that control other parts of the body. Symptoms after a stroke depend on which part of the brain has been affected.      Stroke risk factors  Once youve had a stroke, youre at greater risk for another one. Listed below are some other factors that can increase your risk for a stroke:  High blood pressure  High cholesterol  Cigarette or cigar smoking  Diabetes  Carotid or other artery disease  Atrial fibrillation, atrial flutter, or other heart disease  Not being physically active  Obesity  Certain blood disorders (such as sickle cell anemia)  Excessive alcohol use  Abuse of street drugs  Race  Gender  Family history of stroke  Diet high in salty, fried, or greasy foods    Changes in daily living  Doing your regular tasks may be difficult after youve had a stroke, but you can learn new ways to manage your daily activities. In fact, doing daily activities may help you to regain muscle strength and bring back function to affected limbs. Be patient, give yourself time to adjust, and appreciate the progress you make.    Daily activities  You may be at risk of falling. Make changes to your home to help you walk more easily. A therapist will decide if you need an assistive device to walk safely.  You may need to see an occupational therapist or physical therapist to learn new ways of doing things. For example, you may need to make adjustments when bathing or dressing:    Tips for showering or bathing  Test the water temperature with a hand or foot that was not affected by the stroke.  Use grab bars, a shower seat, a hand-held showerhead, and a long-handled brush.    Tips for getting dressed  Dress while sitting, starting with the affected side or limb.  Wear shirts that pull easily over  your head. Wear pants or skirts with elastic waistbands.  Use zippers with loops attached to the pull tabs.    Lifestyle changes  Take your medicines exactly as directed. Dont skip doses.  Begin an exercise program. Ask your provider how to get started. Also ask how much activity you should try to get on a daily or weekly basis. You can benefit from simple activities such as walking or gardening.  Limit alcohol intake. Men should have no more than 2 alcoholic drinks a day. Women should limit themselves to 1 alcoholic drink per day.  Know your cholesterol level. Follow your providers recommendations about how to keep cholesterol under control.  If you are a smoker, quit now. Join a stop-smoking program to improve your chances of success. Ask your provider about medicines or other methods to help you quit.  Learn stress management techniques to help you deal with stress in your home and work life.    Diet  Your healthcare provider will give you information on dietary changes that you may need to make, based on your situation. Your provider may recommend that you see a registered dietitian for help with diet changes. Changes may include:  Reducing fat and cholesterol intake  Reducing salt (sodium) intake, especially if you have high blood pressure  Eating more fresh vegetables and fruits  Eating more lean proteins, such as fish, poultry, and beans and peas (legumes)  Eating less red meat and processed meats  Using low-fat dairy products  Limiting vegetable oils and nut oils  Limiting sweets and processed foods such as chips, cookies, and baked goods    Follow-up care  Keep your medical appointments. Close follow-up is important to stroke rehabilitation and recovery.  Some medicines require blood tests to check for progress or problems. Keep follow-up appointments for any blood tests ordered by your providers    When to call 911  Call 911 right away if you have any of the following symptoms of stroke:  Weakness,  tingling, or loss of feeling on one side of your face or body  Sudden double vision or trouble seeing in one or both eyes  Sudden trouble talking or slurred speech  Trouble understanding others  Sudden, severe headache  Dizziness, loss of balance, or a sense of falling  Blackouts or seizures      F.A.S.T. is an easy way to remember the signs of stroke. When you see these signs, you know that you need to call 911 fast.  F.A.S.T. stands for:  F is for face drooping. One side of the face is drooping or numb. When the person smiles, the smile is uneven.  A is for arm weakness. One arm is weak or numb. When the person lifts both arms at the same time, one arm may drift downward.  S is for speech difficulty. You may notice slurred speech or trouble speaking. The person can't repeat a simple sentence correctly when asked.  T is for time to call 911. If someone shows any of these symptoms, even if they go away, call 911 immediately. Make note of the time the symptoms first appeared.     © 0184-3235 [employer]. [address] [place] [address], [place] [address]. All rights reserved. This information is not intended as a substitute for professional medical care. Always follow your healthcare professional's instructions.

## 2022-06-23 NOTE — ASSESSMENT & PLAN NOTE
Patient is a 63 y/o male that presents following a recent hospital stay at [facility].   He suddenly developed left arm weakness that lasted ~ 15 mins and resolved prior to ED arrival.   MRI Brain reports punctate subacute right posterior frontal   - unable to view imaging  CTA without LVO  TTE neg for PFO    Pt previously taking Plavix QD for stents and was not consistent with daily aspirin.   - recommend DAPT    - Check Plavix resistance marker    Control stroke risk factors:  BP management as per PCP  Cont statin; LDL at goal  A1c at goal  Diet modification    Stroke education provided

## 2022-06-27 LAB — MAYO MISCELLANEOUS RESULT (REF): NORMAL

## 2022-07-18 ENCOUNTER — PATIENT MESSAGE (OUTPATIENT)
Dept: NEUROLOGY | Facility: CLINIC | Age: 62
End: 2022-07-18
Payer: COMMERCIAL

## 2022-07-22 ENCOUNTER — PATIENT MESSAGE (OUTPATIENT)
Dept: CARDIOLOGY | Facility: CLINIC | Age: 62
End: 2022-07-22
Payer: COMMERCIAL

## 2022-08-05 ENCOUNTER — OFFICE VISIT (OUTPATIENT)
Dept: CARDIOLOGY | Facility: CLINIC | Age: 62
End: 2022-08-05
Payer: COMMERCIAL

## 2022-08-05 VITALS
SYSTOLIC BLOOD PRESSURE: 137 MMHG | HEIGHT: 68 IN | HEART RATE: 72 BPM | DIASTOLIC BLOOD PRESSURE: 89 MMHG | BODY MASS INDEX: 35.61 KG/M2 | WEIGHT: 235 LBS

## 2022-08-05 DIAGNOSIS — G47.33 OSA (OBSTRUCTIVE SLEEP APNEA): ICD-10-CM

## 2022-08-05 DIAGNOSIS — Z95.5 STENTED CORONARY ARTERY: Primary | Chronic | ICD-10-CM

## 2022-08-05 DIAGNOSIS — E78.5 DYSLIPIDEMIA: Chronic | ICD-10-CM

## 2022-08-05 DIAGNOSIS — I10 HYPERTENSION, ESSENTIAL: ICD-10-CM

## 2022-08-05 DIAGNOSIS — I25.10 CORONARY ARTERY DISEASE INVOLVING NATIVE CORONARY ARTERY OF NATIVE HEART WITHOUT ANGINA PECTORIS: Chronic | ICD-10-CM

## 2022-08-05 PROCEDURE — 3075F PR MOST RECENT SYSTOLIC BLOOD PRESS GE 130-139MM HG: ICD-10-PCS | Mod: CPTII,S$GLB,, | Performed by: INTERNAL MEDICINE

## 2022-08-05 PROCEDURE — 99214 PR OFFICE/OUTPT VISIT, EST, LEVL IV, 30-39 MIN: ICD-10-PCS | Mod: S$GLB,,, | Performed by: INTERNAL MEDICINE

## 2022-08-05 PROCEDURE — 99999 PR PBB SHADOW E&M-EST. PATIENT-LVL III: CPT | Mod: PBBFAC,,, | Performed by: INTERNAL MEDICINE

## 2022-08-05 PROCEDURE — 1159F MED LIST DOCD IN RCRD: CPT | Mod: CPTII,S$GLB,, | Performed by: INTERNAL MEDICINE

## 2022-08-05 PROCEDURE — 3008F PR BODY MASS INDEX (BMI) DOCUMENTED: ICD-10-PCS | Mod: CPTII,S$GLB,, | Performed by: INTERNAL MEDICINE

## 2022-08-05 PROCEDURE — 1160F RVW MEDS BY RX/DR IN RCRD: CPT | Mod: CPTII,S$GLB,, | Performed by: INTERNAL MEDICINE

## 2022-08-05 PROCEDURE — 4010F ACE/ARB THERAPY RXD/TAKEN: CPT | Mod: CPTII,S$GLB,, | Performed by: INTERNAL MEDICINE

## 2022-08-05 PROCEDURE — 1160F PR REVIEW ALL MEDS BY PRESCRIBER/CLIN PHARMACIST DOCUMENTED: ICD-10-PCS | Mod: CPTII,S$GLB,, | Performed by: INTERNAL MEDICINE

## 2022-08-05 PROCEDURE — 3079F DIAST BP 80-89 MM HG: CPT | Mod: CPTII,S$GLB,, | Performed by: INTERNAL MEDICINE

## 2022-08-05 PROCEDURE — 99214 OFFICE O/P EST MOD 30 MIN: CPT | Mod: S$GLB,,, | Performed by: INTERNAL MEDICINE

## 2022-08-05 PROCEDURE — 3008F BODY MASS INDEX DOCD: CPT | Mod: CPTII,S$GLB,, | Performed by: INTERNAL MEDICINE

## 2022-08-05 PROCEDURE — 3075F SYST BP GE 130 - 139MM HG: CPT | Mod: CPTII,S$GLB,, | Performed by: INTERNAL MEDICINE

## 2022-08-05 PROCEDURE — 99999 PR PBB SHADOW E&M-EST. PATIENT-LVL III: ICD-10-PCS | Mod: PBBFAC,,, | Performed by: INTERNAL MEDICINE

## 2022-08-05 PROCEDURE — 4010F PR ACE/ARB THEARPY RXD/TAKEN: ICD-10-PCS | Mod: CPTII,S$GLB,, | Performed by: INTERNAL MEDICINE

## 2022-08-05 PROCEDURE — 1159F PR MEDICATION LIST DOCUMENTED IN MEDICAL RECORD: ICD-10-PCS | Mod: CPTII,S$GLB,, | Performed by: INTERNAL MEDICINE

## 2022-08-05 PROCEDURE — 3079F PR MOST RECENT DIASTOLIC BLOOD PRESSURE 80-89 MM HG: ICD-10-PCS | Mod: CPTII,S$GLB,, | Performed by: INTERNAL MEDICINE

## 2022-08-05 NOTE — PROGRESS NOTES
Subjective:    Patient ID:  Niraj Diaz is a 62 y.o. male patient here for evaluation Follow-up (Discuss medications)      History of Present Illness:  Cardiology follow-up.  History of TIA resolved.  Recent Plavix responder test equivocal.  Patient remains on Plavix 75 daily as well as aspirin without  Recurrent symptomatology.  Workup for TIA included bubble contrast echo study, MRI head neck, no significant high-grade extracranial/intracranial vascular disease.    History of coronary disease status post PCI stent 20/17.  Last noninvasive cardiac assessment-20/17.  No angina.  No significant dyspnea.  Patient remains asymptomatic with good exercise tolerance.      Review of patient's allergies indicates:  No Known Allergies    Past Medical History:   Diagnosis Date    Bulging lumbar disc     Hypertension      Past Surgical History:   Procedure Laterality Date    VASECTOMY       Social History     Tobacco Use    Smoking status: Never Smoker    Smokeless tobacco: Never Used   Substance Use Topics    Alcohol use: No    Drug use: No        Review of Systems:    As noted in HPI in addition      REVIEW OF SYSTEMS  Review of Systems   Constitutional: Negative for decreased appetite, diaphoresis, night sweats, weight gain and weight loss.   HENT: Negative for nosebleeds and odynophagia.    Eyes: Negative for double vision and photophobia.   Cardiovascular: Negative for chest pain, claudication, cyanosis, dyspnea on exertion, irregular heartbeat, leg swelling, near-syncope, orthopnea, palpitations, paroxysmal nocturnal dyspnea and syncope.   Respiratory: Negative for cough, hemoptysis, shortness of breath and wheezing.    Hematologic/Lymphatic: Negative for adenopathy.   Skin: Negative for flushing, skin cancer and suspicious lesions.   Musculoskeletal: Negative for gout, myalgias and neck pain.   Gastrointestinal: Negative for abdominal pain, heartburn, hematemesis and hematochezia.   Genitourinary: Negative for  bladder incontinence, hesitancy and nocturia.   Neurological: Negative for focal weakness, headaches, light-headedness and paresthesias.   Psychiatric/Behavioral: Negative for memory loss and substance abuse.              Objective:        Vitals:    08/05/22 1145   BP: 137/89   Pulse: 72       Lab Results   Component Value Date    WBC 5.86 07/05/2020    HGB 16.5 07/05/2020    HCT 46.7 07/05/2020     07/05/2020    CHOL 133 06/07/2022    TRIG 88 06/07/2022    HDL 48 06/07/2022    ALT 33 07/05/2020    AST 38 07/05/2020     07/05/2020    K 4.2 07/05/2020     07/05/2020    CREATININE 0.72 07/05/2020    BUN 7 (L) 07/05/2020    CO2 26 07/05/2020    TSH 1.200 10/29/2013    PSA 0.47 01/14/2009    HGBA1C 5.7 06/07/2022        ECHOCARDIOGRAM RESULTS  No results found for this or any previous visit.        CURRENT/PREVIOUS VISIT EKG  Results for orders placed or performed during the hospital encounter of 07/05/20   EKG 12-lead    Collection Time: 07/05/20 11:18 AM    Narrative    Test Reason : R06.02,    Vent. Rate : 098 BPM     Atrial Rate : 098 BPM     P-R Int : 178 ms          QRS Dur : 096 ms      QT Int : 354 ms       P-R-T Axes : 022 070 018 degrees     QTc Int : 451 ms    Normal sinus rhythm  Incomplete right bundle branch block  Borderline Abnormal ECG  When compared with ECG of 13-OCT-2017 09:41,  Previous ECG has undetermined rhythm, needs review  QT has lengthened  Confirmed by Nikos NEFF, Colin WOLFF ([id number]) on 7/6/2020 1:57:25 PM    Referred By: NELL   SELF           Confirmed By:Colin Knott MD     No valid procedures specified.   No results found for this or any previous visit.    No valid procedures specified.    PHYSICAL EXAM  CONSTITUTIONAL: Well built, well nourished in no apparent distress  NECK: no carotid bruit, no JVD  LUNGS: CTA  CHEST WALL: no tenderness,  HEART: regular rate and rhythm, S1, S2 normal, no murmur, click, rub or gallop   ABDOMEN: soft, non-tender; bowel sounds  normal; no masses,  no organomegaly  EXTREMITIES: Extremities normal, no edema, no calf tenderness noted  NEURO: AAO X 3    I HAVE REVIEWED :    The vital signs, nurses notes, and all the pertinent radiology and labs.         Current Outpatient Medications   Medication Instructions    aspirin 325 mg, Oral, Daily    atorvastatin (LIPITOR) 40 MG tablet TAKE 1 TABLET BY MOUTH EVERY DAY    clopidogreL (PLAVIX) 75 mg tablet TAKE 1 TABLET BY MOUTH EVERY DAY    metoprolol succinate (TOPROL-XL) 50 MG 24 hr tablet TAKE 1 TABLET BY MOUTH EVERY DAY    nitroGLYCERIN (NITROSTAT) 0.4 mg, Sublingual, Every 5 min PRN    valsartan (DIOVAN) 80 MG tablet TAKE 1 TABLET BY MOUTH EVERY DAY          Assessment:   TIA, no recurrent neurologic symptomatology.  Remains stable on Plavix 75 mg, aspirin 325.    Coronary disease history of acute coronary syndrome PCI stent 20/17.  Last noninvasive cardiac assessment negative 2017.  No angina.  No shortness of breath.  No arrhythmia.    Workup for TIA with negative bubble contrast.  No significant intracranial/ extracranial large vessel disease.          Plan:   Six months.  No change current medications.  Continue aspirin 81, Plavix 75.     Risk factor modification.  Rales diet exercise.      No follow-ups on file.

## 2022-09-14 DIAGNOSIS — M25.561 RIGHT KNEE PAIN: Primary | ICD-10-CM

## 2022-09-19 ENCOUNTER — PATIENT MESSAGE (OUTPATIENT)
Dept: NEUROLOGY | Facility: CLINIC | Age: 62
End: 2022-09-19
Payer: COMMERCIAL

## 2022-09-24 ENCOUNTER — PATIENT MESSAGE (OUTPATIENT)
Dept: NEUROLOGY | Facility: CLINIC | Age: 62
End: 2022-09-24
Payer: COMMERCIAL

## 2022-09-26 ENCOUNTER — TELEPHONE (OUTPATIENT)
Dept: NEUROLOGY | Facility: CLINIC | Age: 62
End: 2022-09-26
Payer: COMMERCIAL

## 2022-09-26 ENCOUNTER — PATIENT MESSAGE (OUTPATIENT)
Dept: NEUROLOGY | Facility: CLINIC | Age: 62
End: 2022-09-26
Payer: COMMERCIAL

## 2022-09-26 NOTE — TELEPHONE ENCOUNTER
Spoke with insurance to discuss the Appeal. Insurance states that they are unable to help me do to Ester lOmos NP not being the provider under the reference [place].     Attempted to call patient to further discuss no answer left message to call back

## 2022-09-26 NOTE — TELEPHONE ENCOUNTER
----- Message from Shemar Mehta sent at 9/26/2022  9:07 AM CDT -----  Regarding: Test Appeal needed for Approval  Type: Needs Medical Advice    Who Called:  wife // Aure Sarah Call Back Number: [phone]    Additional Information: KADY Olmos ordered Misc Sendout Test, Blood gene [id number]. [facility] denied lab. Ins needs additional information from KADY Olmos to approve claim for pt.    Please call [facility] Appeals team at [phone] to discuss needed information from CARLOS Olmos to help with Appeal. Please use REF [id number] when calling to discuss Appeal.    NOTE: This is not a billing issue as indicated in the portal message. This is an Appeals issue.

## 2022-09-26 NOTE — TELEPHONE ENCOUNTER
Spoke with patient's wife informing her that I spoke with patient's insurance to get information of what is needed. I informed her that the insurance states that Ester Olmos NP isn't linked to the reference number and that they are unable to assist me and advised to have the patient's call. Patient's wife v/u

## 2022-09-26 NOTE — TELEPHONE ENCOUNTER
----- Message from Toyin Najera sent at 9/26/2022  3:38 PM CDT -----  Regarding: returning call  Contact: patient  Type:  Patient Returning Call    Who Called:  Aure Diaz, wife  Who Left Message for Patient:  Meredith  Does the patient know what this is regarding?:  insurance denial  Best Call Back Number:  [phone]  Additional Information:  Please call Aure to advise. Thanks!

## 2022-09-29 ENCOUNTER — OFFICE VISIT (OUTPATIENT)
Dept: FAMILY MEDICINE | Facility: CLINIC | Age: 62
End: 2022-09-29
Payer: COMMERCIAL

## 2022-09-29 VITALS
TEMPERATURE: 98 F | DIASTOLIC BLOOD PRESSURE: 86 MMHG | SYSTOLIC BLOOD PRESSURE: 137 MMHG | WEIGHT: 242.19 LBS | HEART RATE: 70 BPM | HEIGHT: 68 IN | BODY MASS INDEX: 36.71 KG/M2

## 2022-09-29 DIAGNOSIS — R21 RASH: ICD-10-CM

## 2022-09-29 DIAGNOSIS — L25.9 CONTACT DERMATITIS, UNSPECIFIED CONTACT DERMATITIS TYPE, UNSPECIFIED TRIGGER: Primary | ICD-10-CM

## 2022-09-29 PROCEDURE — 3075F PR MOST RECENT SYSTOLIC BLOOD PRESS GE 130-139MM HG: ICD-10-PCS | Mod: CPTII,S$GLB,, | Performed by: NURSE PRACTITIONER

## 2022-09-29 PROCEDURE — 99999 PR PBB SHADOW E&M-EST. PATIENT-LVL IV: CPT | Mod: PBBFAC,,, | Performed by: NURSE PRACTITIONER

## 2022-09-29 PROCEDURE — 1160F RVW MEDS BY RX/DR IN RCRD: CPT | Mod: CPTII,S$GLB,, | Performed by: NURSE PRACTITIONER

## 2022-09-29 PROCEDURE — 4010F ACE/ARB THERAPY RXD/TAKEN: CPT | Mod: CPTII,S$GLB,, | Performed by: NURSE PRACTITIONER

## 2022-09-29 PROCEDURE — 96372 PR INJECTION,THERAP/PROPH/DIAG2ST, IM OR SUBCUT: ICD-10-PCS | Mod: S$GLB,,, | Performed by: NURSE PRACTITIONER

## 2022-09-29 PROCEDURE — 99203 PR OFFICE/OUTPT VISIT, NEW, LEVL III, 30-44 MIN: ICD-10-PCS | Mod: 25,S$GLB,, | Performed by: NURSE PRACTITIONER

## 2022-09-29 PROCEDURE — 96372 THER/PROPH/DIAG INJ SC/IM: CPT | Mod: S$GLB,,, | Performed by: NURSE PRACTITIONER

## 2022-09-29 PROCEDURE — 99203 OFFICE O/P NEW LOW 30 MIN: CPT | Mod: 25,S$GLB,, | Performed by: NURSE PRACTITIONER

## 2022-09-29 PROCEDURE — 3079F DIAST BP 80-89 MM HG: CPT | Mod: CPTII,S$GLB,, | Performed by: NURSE PRACTITIONER

## 2022-09-29 PROCEDURE — 3079F PR MOST RECENT DIASTOLIC BLOOD PRESSURE 80-89 MM HG: ICD-10-PCS | Mod: CPTII,S$GLB,, | Performed by: NURSE PRACTITIONER

## 2022-09-29 PROCEDURE — 3075F SYST BP GE 130 - 139MM HG: CPT | Mod: CPTII,S$GLB,, | Performed by: NURSE PRACTITIONER

## 2022-09-29 PROCEDURE — 3008F BODY MASS INDEX DOCD: CPT | Mod: CPTII,S$GLB,, | Performed by: NURSE PRACTITIONER

## 2022-09-29 PROCEDURE — 99999 PR PBB SHADOW E&M-EST. PATIENT-LVL IV: ICD-10-PCS | Mod: PBBFAC,,, | Performed by: NURSE PRACTITIONER

## 2022-09-29 PROCEDURE — 1160F PR REVIEW ALL MEDS BY PRESCRIBER/CLIN PHARMACIST DOCUMENTED: ICD-10-PCS | Mod: CPTII,S$GLB,, | Performed by: NURSE PRACTITIONER

## 2022-09-29 PROCEDURE — 1159F MED LIST DOCD IN RCRD: CPT | Mod: CPTII,S$GLB,, | Performed by: NURSE PRACTITIONER

## 2022-09-29 PROCEDURE — 1159F PR MEDICATION LIST DOCUMENTED IN MEDICAL RECORD: ICD-10-PCS | Mod: CPTII,S$GLB,, | Performed by: NURSE PRACTITIONER

## 2022-09-29 PROCEDURE — 3008F PR BODY MASS INDEX (BMI) DOCUMENTED: ICD-10-PCS | Mod: CPTII,S$GLB,, | Performed by: NURSE PRACTITIONER

## 2022-09-29 PROCEDURE — 4010F PR ACE/ARB THEARPY RXD/TAKEN: ICD-10-PCS | Mod: CPTII,S$GLB,, | Performed by: NURSE PRACTITIONER

## 2022-09-29 RX ORDER — HYDROXYZINE PAMOATE 25 MG/1
25 CAPSULE ORAL EVERY 12 HOURS PRN
Qty: 20 CAPSULE | Refills: 0 | Status: SHIPPED | OUTPATIENT
Start: 2022-09-29 | End: 2022-10-04

## 2022-09-29 RX ORDER — TRIAMCINOLONE ACETONIDE 5 MG/G
CREAM TOPICAL 2 TIMES DAILY
Qty: 30 G | Refills: 0 | Status: SHIPPED | OUTPATIENT
Start: 2022-09-29

## 2022-09-29 RX ORDER — DEXAMETHASONE SODIUM PHOSPHATE 4 MG/ML
8 INJECTION, SOLUTION INTRA-ARTICULAR; INTRALESIONAL; INTRAMUSCULAR; INTRAVENOUS; SOFT TISSUE ONCE
Status: COMPLETED | OUTPATIENT
Start: 2022-09-29 | End: 2022-09-29

## 2022-09-29 RX ADMIN — DEXAMETHASONE SODIUM PHOSPHATE 8 MG: 4 INJECTION, SOLUTION INTRA-ARTICULAR; INTRALESIONAL; INTRAMUSCULAR; INTRAVENOUS; SOFT TISSUE at 08:09

## 2022-09-29 NOTE — PATIENT INSTRUCTIONS
Atarax causes drowsiness  Aveeno oatmeal baths OTC as directed  F/U with PCP ASAP  Report to ER immediately if symptoms worsen or persist

## 2022-09-29 NOTE — PROGRESS NOTES
Subjective:       Patient ID: Niraj Diaz is a 62 y.o. male.    Chief Complaint: Poison Ivy (arm)    Poison Ivy  This is a new problem. The current episode started 1 to 4 weeks ago. The problem is unchanged. Location: bilateral forearms. The rash is characterized by redness and itchiness. He was exposed to plant contact. Pertinent negatives include no anorexia, congestion, cough, diarrhea, eye pain, facial edema, fatigue, fever, joint pain, nail changes, rhinorrhea, shortness of breath, sore throat or vomiting. Past treatments include anti-itch cream. The treatment provided mild relief. There is no history of allergies, asthma, eczema or varicella.   Past Medical History:   Diagnosis Date    Bulging lumbar disc     Hypertension      Social History     Socioeconomic History    Marital status:    Tobacco Use    Smoking status: Never    Smokeless tobacco: Never   Substance and Sexual Activity    Alcohol use: No    Drug use: No    Sexual activity: Yes     Partners: Female   Social History Narrative    [employer] - M x 31, lives in [facility], 4 kids       Past Surgical History:   Procedure Laterality Date    VASECTOMY         Review of Systems   Constitutional: Negative.  Negative for fatigue and fever.   HENT: Negative.  Negative for nasal congestion, rhinorrhea and sore throat.    Eyes: Negative.  Negative for pain.   Respiratory: Negative.  Negative for cough and shortness of breath.    Cardiovascular: Negative.    Gastrointestinal: Negative.  Negative for anorexia, diarrhea and vomiting.   Endocrine: Negative.    Genitourinary: Negative.    Musculoskeletal: Negative.  Negative for joint pain.   Integumentary:  Positive for rash. Negative for nail changes.   Allergic/Immunologic: Negative.    Neurological: Negative.    Psychiatric/Behavioral: Negative.         Objective:      Physical Exam  Vitals and nursing note reviewed.   Constitutional:       Appearance: Normal appearance.   HENT:      Head:  Normocephalic.      Right Ear: Tympanic membrane, ear canal and external ear normal.      Left Ear: Tympanic membrane, ear canal and external ear normal.      Nose: Nose normal.      Mouth/Throat:      Mouth: Mucous membranes are moist.      Pharynx: Oropharynx is clear.   Eyes:      Conjunctiva/sclera: Conjunctivae normal.      Pupils: Pupils are equal, round, and reactive to light.   Cardiovascular:      Rate and Rhythm: Normal rate and regular rhythm.      Pulses: Normal pulses.      Heart sounds: Normal heart sounds.   Pulmonary:      Effort: Pulmonary effort is normal.      Breath sounds: Normal breath sounds.   Abdominal:      General: Bowel sounds are normal.      Palpations: Abdomen is soft.   Musculoskeletal:         General: Normal range of motion.      Cervical back: Normal range of motion and neck supple.   Skin:     General: Skin is warm and dry.      Capillary Refill: Capillary refill takes 2 to 3 seconds.      Findings: Rash (bilataral forearms; pruritic) present. Rash is urticarial.   Neurological:      Mental Status: He is alert and oriented to person, place, and time.   Psychiatric:         Mood and Affect: Mood normal.         Behavior: Behavior normal.         Thought Content: Thought content normal.         Judgment: Judgment normal.       Assessment:       Problem List Items Addressed This Visit    None  Visit Diagnoses       Contact dermatitis, unspecified contact dermatitis type, unspecified trigger    -  Primary    Rash                  Plan:           Niraj was seen today for poison ivy.    Diagnoses and all orders for this visit:    Contact dermatitis, unspecified contact dermatitis type, unspecified trigger  Rash        -     dexamethasone injection 8 mg  -     triamcinolone acetonide 0.5% (KENALOG) 0.5 % Crea; Apply topically 2 (two) times daily.  -     hydrOXYzine pamoate (VISTARIL) 25 MG Cap; Take 1 capsule (25 mg total) by mouth every 12 (twelve) hours as needed.  Aveeno oatmeal baths  OTC as directed  F/U with PCP ASAP  Report to ER immediately if symptoms worsen or persist

## 2022-09-30 ENCOUNTER — PATIENT MESSAGE (OUTPATIENT)
Dept: NEUROLOGY | Facility: CLINIC | Age: 62
End: 2022-09-30
Payer: COMMERCIAL

## 2022-11-08 ENCOUNTER — PATIENT MESSAGE (OUTPATIENT)
Dept: NEUROLOGY | Facility: CLINIC | Age: 62
End: 2022-11-08
Payer: COMMERCIAL

## 2022-11-09 ENCOUNTER — PATIENT MESSAGE (OUTPATIENT)
Dept: NEUROLOGY | Facility: CLINIC | Age: 62
End: 2022-11-09
Payer: COMMERCIAL

## 2023-01-20 ENCOUNTER — PATIENT MESSAGE (OUTPATIENT)
Dept: CARDIOLOGY | Facility: CLINIC | Age: 63
End: 2023-01-20
Payer: COMMERCIAL

## 2023-01-20 DIAGNOSIS — I25.10 CORONARY ARTERY DISEASE INVOLVING NATIVE CORONARY ARTERY OF NATIVE HEART WITHOUT ANGINA PECTORIS: Chronic | ICD-10-CM

## 2023-01-20 DIAGNOSIS — Z95.5 STENTED CORONARY ARTERY: Chronic | ICD-10-CM

## 2023-01-20 DIAGNOSIS — E78.5 DYSLIPIDEMIA: Chronic | ICD-10-CM

## 2023-01-20 RX ORDER — ATORVASTATIN CALCIUM 40 MG/1
40 TABLET, FILM COATED ORAL DAILY
Qty: 90 TABLET | Refills: 2 | Status: SHIPPED | OUTPATIENT
Start: 2023-01-20

## 2023-02-10 ENCOUNTER — OFFICE VISIT (OUTPATIENT)
Dept: CARDIOLOGY | Facility: CLINIC | Age: 63
End: 2023-02-10
Payer: COMMERCIAL

## 2023-02-10 VITALS
BODY MASS INDEX: 36.75 KG/M2 | DIASTOLIC BLOOD PRESSURE: 94 MMHG | HEART RATE: 78 BPM | HEIGHT: 68 IN | WEIGHT: 242.5 LBS | SYSTOLIC BLOOD PRESSURE: 143 MMHG

## 2023-02-10 DIAGNOSIS — E78.5 DYSLIPIDEMIA: Chronic | ICD-10-CM

## 2023-02-10 DIAGNOSIS — M54.16 LUMBAR RADICULOPATHY: Primary | ICD-10-CM

## 2023-02-10 DIAGNOSIS — I10 HYPERTENSION, ESSENTIAL: ICD-10-CM

## 2023-02-10 DIAGNOSIS — I25.10 CORONARY ARTERY DISEASE INVOLVING NATIVE CORONARY ARTERY OF NATIVE HEART WITHOUT ANGINA PECTORIS: Chronic | ICD-10-CM

## 2023-02-10 DIAGNOSIS — G47.33 OSA (OBSTRUCTIVE SLEEP APNEA): ICD-10-CM

## 2023-02-10 DIAGNOSIS — M51.36 DDD (DEGENERATIVE DISC DISEASE), LUMBAR: ICD-10-CM

## 2023-02-10 DIAGNOSIS — Z95.5 STENTED CORONARY ARTERY: Chronic | ICD-10-CM

## 2023-02-10 PROCEDURE — 1160F RVW MEDS BY RX/DR IN RCRD: CPT | Mod: CPTII,S$GLB,, | Performed by: INTERNAL MEDICINE

## 2023-02-10 PROCEDURE — 3077F PR MOST RECENT SYSTOLIC BLOOD PRESSURE >= 140 MM HG: ICD-10-PCS | Mod: CPTII,S$GLB,, | Performed by: INTERNAL MEDICINE

## 2023-02-10 PROCEDURE — 3080F DIAST BP >= 90 MM HG: CPT | Mod: CPTII,S$GLB,, | Performed by: INTERNAL MEDICINE

## 2023-02-10 PROCEDURE — 3080F PR MOST RECENT DIASTOLIC BLOOD PRESSURE >= 90 MM HG: ICD-10-PCS | Mod: CPTII,S$GLB,, | Performed by: INTERNAL MEDICINE

## 2023-02-10 PROCEDURE — 99214 PR OFFICE/OUTPT VISIT, EST, LEVL IV, 30-39 MIN: ICD-10-PCS | Mod: S$GLB,,, | Performed by: INTERNAL MEDICINE

## 2023-02-10 PROCEDURE — 1159F PR MEDICATION LIST DOCUMENTED IN MEDICAL RECORD: ICD-10-PCS | Mod: CPTII,S$GLB,, | Performed by: INTERNAL MEDICINE

## 2023-02-10 PROCEDURE — 1159F MED LIST DOCD IN RCRD: CPT | Mod: CPTII,S$GLB,, | Performed by: INTERNAL MEDICINE

## 2023-02-10 PROCEDURE — 3077F SYST BP >= 140 MM HG: CPT | Mod: CPTII,S$GLB,, | Performed by: INTERNAL MEDICINE

## 2023-02-10 PROCEDURE — 3008F PR BODY MASS INDEX (BMI) DOCUMENTED: ICD-10-PCS | Mod: CPTII,S$GLB,, | Performed by: INTERNAL MEDICINE

## 2023-02-10 PROCEDURE — 1160F PR REVIEW ALL MEDS BY PRESCRIBER/CLIN PHARMACIST DOCUMENTED: ICD-10-PCS | Mod: CPTII,S$GLB,, | Performed by: INTERNAL MEDICINE

## 2023-02-10 PROCEDURE — 4010F ACE/ARB THERAPY RXD/TAKEN: CPT | Mod: CPTII,S$GLB,, | Performed by: INTERNAL MEDICINE

## 2023-02-10 PROCEDURE — 99999 PR PBB SHADOW E&M-EST. PATIENT-LVL III: CPT | Mod: PBBFAC,,, | Performed by: INTERNAL MEDICINE

## 2023-02-10 PROCEDURE — 99999 PR PBB SHADOW E&M-EST. PATIENT-LVL III: ICD-10-PCS | Mod: PBBFAC,,, | Performed by: INTERNAL MEDICINE

## 2023-02-10 PROCEDURE — 3008F BODY MASS INDEX DOCD: CPT | Mod: CPTII,S$GLB,, | Performed by: INTERNAL MEDICINE

## 2023-02-10 PROCEDURE — 99214 OFFICE O/P EST MOD 30 MIN: CPT | Mod: S$GLB,,, | Performed by: INTERNAL MEDICINE

## 2023-02-10 PROCEDURE — 4010F PR ACE/ARB THEARPY RXD/TAKEN: ICD-10-PCS | Mod: CPTII,S$GLB,, | Performed by: INTERNAL MEDICINE

## 2023-02-10 NOTE — PROGRESS NOTES
Subjective:    Patient ID:  Niraj Diaz is a 62 y.o. male patient here for evaluation Follow-up      History of Present Illness:  Cardiology follow-up.  Known coronary disease.  Remote PCI stent [facility], no records.  Last noninvasive nuclear study 2017 with unremarkable echo and nuclear study.  Risk factors include possible hypertension, dyslipidemia.    Remote history of TIA worked up and evaluated by Neurology.  Non recurrent.  Screening studies for carotid disease and cardiac shock were apparently negative.  Patient remains on Plavix and aspirin.    No chest pain.  No significant dyspnea.  No arrhythmia.  No edema.  No PND orthopnea.      Review of patient's allergies indicates:  No Known Allergies    Past Medical History:   Diagnosis Date    Bulging lumbar disc     Hypertension      Past Surgical History:   Procedure Laterality Date    VASECTOMY       Social History     Tobacco Use    Smoking status: Never    Smokeless tobacco: Never   Substance Use Topics    Alcohol use: No    Drug use: No        Review of Systems:    As noted in HPI in addition      REVIEW OF SYSTEMS  Review of Systems   Constitutional: Negative for decreased appetite, diaphoresis, night sweats, weight gain and weight loss.   HENT:  Negative for nosebleeds and odynophagia.    Eyes:  Negative for double vision and photophobia.   Cardiovascular:  Negative for chest pain, claudication, cyanosis, dyspnea on exertion, irregular heartbeat, leg swelling, near-syncope, orthopnea, palpitations, paroxysmal nocturnal dyspnea and syncope.   Respiratory:  Negative for cough, hemoptysis, shortness of breath and wheezing.    Hematologic/Lymphatic: Negative for adenopathy.   Skin:  Negative for flushing, skin cancer and suspicious lesions.   Musculoskeletal:  Negative for gout, myalgias and neck pain.   Gastrointestinal:  Negative for abdominal pain, heartburn, hematemesis and hematochezia.   Genitourinary:  Negative for bladder incontinence,  hesitancy and nocturia.   Neurological:  Negative for focal weakness, headaches, light-headedness and paresthesias.   Psychiatric/Behavioral:  Negative for memory loss and substance abuse.             Objective:        Vitals:    02/10/23 1025   BP: (!) 143/94   Pulse: 78       Lab Results   Component Value Date    WBC 5.86 07/05/2020    HGB 16.5 07/05/2020    HCT 46.7 07/05/2020     07/05/2020    CHOL 133 06/07/2022    TRIG 88 06/07/2022    HDL 48 06/07/2022    ALT 33 07/05/2020    AST 38 07/05/2020     07/05/2020    K 4.2 07/05/2020     07/05/2020    CREATININE 0.72 07/05/2020    BUN 7 (L) 07/05/2020    CO2 26 07/05/2020    TSH 1.200 10/29/2013    PSA 0.47 01/14/2009    HGBA1C 5.7 06/07/2022        ECHOCARDIOGRAM RESULTS  No results found for this or any previous visit.        CURRENT/PREVIOUS VISIT EKG  Results for orders placed or performed during the hospital encounter of 07/05/20   EKG 12-lead    Collection Time: 07/05/20 11:18 AM    Narrative    Test Reason : R06.02,    Vent. Rate : 098 BPM     Atrial Rate : 098 BPM     P-R Int : 178 ms          QRS Dur : 096 ms      QT Int : 354 ms       P-R-T Axes : 022 070 018 degrees     QTc Int : 451 ms    Normal sinus rhythm  Incomplete right bundle branch block  Borderline Abnormal ECG  When compared with ECG of 13-OCT-2017 09:41,  Previous ECG has undetermined rhythm, needs review  QT has lengthened  Confirmed by Nikos NEFF, Colin WOLFF ([id number]) on 7/6/2020 1:57:25 PM    Referred By: NELL   SELF           Confirmed By:Colin Knott MD     No valid procedures specified.   No results found for this or any previous visit.    No valid procedures specified.    PHYSICAL EXAM  CONSTITUTIONAL: Well built, well nourished in no apparent distress  NECK: no carotid bruit, no JVD  LUNGS: CTA  CHEST WALL: no tenderness,  HEART: regular rate and rhythm, S1, S2 normal, no murmur, click, rub or gallop   ABDOMEN: soft, non-tender; bowel sounds normal; no masses,  no  organomegaly  EXTREMITIES: Extremities normal, no edema, no calf tenderness noted  NEURO: AAO X 3    I HAVE REVIEWED :    The vital signs, nurses notes, and all the pertinent radiology and labs.         Current Outpatient Medications   Medication Instructions    aspirin 325 mg, Oral, Daily    atorvastatin (LIPITOR) 40 mg, Oral, Daily    clopidogreL (PLAVIX) 75 mg tablet TAKE 1 TABLET BY MOUTH EVERY DAY    metoprolol succinate (TOPROL-XL) 50 MG 24 hr tablet TAKE 1 TABLET BY MOUTH EVERY DAY    nitroGLYCERIN (NITROSTAT) 0.4 mg, Sublingual, Every 5 min PRN    triamcinolone acetonide 0.5% (KENALOG) 0.5 % Crea Topical (Top), 2 times daily    valsartan (DIOVAN) 80 mg, Oral, Daily          Assessment:   Coronary disease.  Remote PCI stent [facility], no records.  Negative noninvasive cardiac assessment 2017.  Echo , nuclear study.  Hypertension, dyslipidemia  TIA, following past by neurology.  Non recurrent.  No angina, no significant dyspnea.      Plan:   Blood pressure today mildly elevated.  Blood pressures at home have been documented to be normal.  Continue valsartan 80, Toprol-XL 50 mg daily, dual antiplatelet therapy.  Lipitor.  Weight loss diet exercise.        No follow-ups on file.

## 2023-05-11 DIAGNOSIS — E78.5 DYSLIPIDEMIA: Chronic | ICD-10-CM

## 2023-05-11 DIAGNOSIS — Z95.5 STENTED CORONARY ARTERY: Chronic | ICD-10-CM

## 2023-05-11 DIAGNOSIS — I25.10 CORONARY ARTERY DISEASE INVOLVING NATIVE CORONARY ARTERY OF NATIVE HEART WITHOUT ANGINA PECTORIS: Chronic | ICD-10-CM

## 2023-05-11 RX ORDER — CLOPIDOGREL BISULFATE 75 MG/1
TABLET ORAL
Qty: 90 TABLET | Refills: 4 | Status: SHIPPED | OUTPATIENT
Start: 2023-05-11

## 2023-08-11 ENCOUNTER — OFFICE VISIT (OUTPATIENT)
Dept: FAMILY MEDICINE | Facility: CLINIC | Age: 63
End: 2023-08-11
Payer: COMMERCIAL

## 2023-08-11 VITALS
SYSTOLIC BLOOD PRESSURE: 139 MMHG | TEMPERATURE: 99 F | BODY MASS INDEX: 37.13 KG/M2 | DIASTOLIC BLOOD PRESSURE: 89 MMHG | OXYGEN SATURATION: 98 % | HEART RATE: 91 BPM | WEIGHT: 244.19 LBS

## 2023-08-11 DIAGNOSIS — R09.82 POST-NASAL DRIP: ICD-10-CM

## 2023-08-11 DIAGNOSIS — J32.9 SINUSITIS, UNSPECIFIED CHRONICITY, UNSPECIFIED LOCATION: Primary | ICD-10-CM

## 2023-08-11 DIAGNOSIS — R05.9 COUGH, UNSPECIFIED TYPE: ICD-10-CM

## 2023-08-11 DIAGNOSIS — J02.9 PHARYNGITIS, UNSPECIFIED ETIOLOGY: ICD-10-CM

## 2023-08-11 LAB
CTP QC/QA: YES
CTP QC/QA: YES
POC MOLECULAR INFLUENZA A AGN: NEGATIVE
POC MOLECULAR INFLUENZA B AGN: NEGATIVE
SARS-COV-2 RDRP RESP QL NAA+PROBE: NEGATIVE

## 2023-08-11 PROCEDURE — 3079F PR MOST RECENT DIASTOLIC BLOOD PRESSURE 80-89 MM HG: ICD-10-PCS | Mod: CPTII,S$GLB,, | Performed by: NURSE PRACTITIONER

## 2023-08-11 PROCEDURE — 1159F MED LIST DOCD IN RCRD: CPT | Mod: CPTII,S$GLB,, | Performed by: NURSE PRACTITIONER

## 2023-08-11 PROCEDURE — 4010F ACE/ARB THERAPY RXD/TAKEN: CPT | Mod: CPTII,S$GLB,, | Performed by: NURSE PRACTITIONER

## 2023-08-11 PROCEDURE — 99999 PR PBB SHADOW E&M-EST. PATIENT-LVL V: ICD-10-PCS | Mod: PBBFAC,,, | Performed by: NURSE PRACTITIONER

## 2023-08-11 PROCEDURE — 87502 INFLUENZA DNA AMP PROBE: CPT | Mod: QW,S$GLB,, | Performed by: NURSE PRACTITIONER

## 2023-08-11 PROCEDURE — 99999 PR PBB SHADOW E&M-EST. PATIENT-LVL V: CPT | Mod: PBBFAC,,, | Performed by: NURSE PRACTITIONER

## 2023-08-11 PROCEDURE — 99213 PR OFFICE/OUTPT VISIT, EST, LEVL III, 20-29 MIN: ICD-10-PCS | Mod: S$GLB,,, | Performed by: NURSE PRACTITIONER

## 2023-08-11 PROCEDURE — 4010F PR ACE/ARB THEARPY RXD/TAKEN: ICD-10-PCS | Mod: CPTII,S$GLB,, | Performed by: NURSE PRACTITIONER

## 2023-08-11 PROCEDURE — 87502 POCT INFLUENZA A/B MOLECULAR: ICD-10-PCS | Mod: QW,S$GLB,, | Performed by: NURSE PRACTITIONER

## 2023-08-11 PROCEDURE — 1160F RVW MEDS BY RX/DR IN RCRD: CPT | Mod: CPTII,S$GLB,, | Performed by: NURSE PRACTITIONER

## 2023-08-11 PROCEDURE — 1159F PR MEDICATION LIST DOCUMENTED IN MEDICAL RECORD: ICD-10-PCS | Mod: CPTII,S$GLB,, | Performed by: NURSE PRACTITIONER

## 2023-08-11 PROCEDURE — 1160F PR REVIEW ALL MEDS BY PRESCRIBER/CLIN PHARMACIST DOCUMENTED: ICD-10-PCS | Mod: CPTII,S$GLB,, | Performed by: NURSE PRACTITIONER

## 2023-08-11 PROCEDURE — 99213 OFFICE O/P EST LOW 20 MIN: CPT | Mod: S$GLB,,, | Performed by: NURSE PRACTITIONER

## 2023-08-11 PROCEDURE — 3079F DIAST BP 80-89 MM HG: CPT | Mod: CPTII,S$GLB,, | Performed by: NURSE PRACTITIONER

## 2023-08-11 PROCEDURE — 3008F BODY MASS INDEX DOCD: CPT | Mod: CPTII,S$GLB,, | Performed by: NURSE PRACTITIONER

## 2023-08-11 PROCEDURE — 3075F SYST BP GE 130 - 139MM HG: CPT | Mod: CPTII,S$GLB,, | Performed by: NURSE PRACTITIONER

## 2023-08-11 PROCEDURE — 87635 SARS-COV-2 COVID-19 AMP PRB: CPT | Mod: QW,S$GLB,, | Performed by: NURSE PRACTITIONER

## 2023-08-11 PROCEDURE — 3008F PR BODY MASS INDEX (BMI) DOCUMENTED: ICD-10-PCS | Mod: CPTII,S$GLB,, | Performed by: NURSE PRACTITIONER

## 2023-08-11 PROCEDURE — 87635: ICD-10-PCS | Mod: QW,S$GLB,, | Performed by: NURSE PRACTITIONER

## 2023-08-11 PROCEDURE — 3075F PR MOST RECENT SYSTOLIC BLOOD PRESS GE 130-139MM HG: ICD-10-PCS | Mod: CPTII,S$GLB,, | Performed by: NURSE PRACTITIONER

## 2023-08-11 RX ORDER — AZELASTINE 1 MG/ML
1 SPRAY, METERED NASAL 2 TIMES DAILY
Qty: 30 ML | Refills: 0 | Status: SHIPPED | OUTPATIENT
Start: 2023-08-11 | End: 2023-08-18

## 2023-08-11 RX ORDER — CEPHALEXIN 500 MG/1
500 CAPSULE ORAL EVERY 12 HOURS
Qty: 20 CAPSULE | Refills: 0 | Status: SHIPPED | OUTPATIENT
Start: 2023-08-11 | End: 2023-08-21

## 2023-08-11 RX ORDER — PROMETHAZINE HYDROCHLORIDE AND DEXTROMETHORPHAN HYDROBROMIDE 6.25; 15 MG/5ML; MG/5ML
5 SYRUP ORAL 2 TIMES DAILY PRN
Qty: 118 ML | Refills: 0 | Status: SHIPPED | OUTPATIENT
Start: 2023-08-11 | End: 2023-08-21

## 2023-08-11 NOTE — PATIENT INSTRUCTIONS
Hydrate well  Rest  Tylenol OTC as directed  Cough medication causes drowsiness  F/U with PCP ASAP  Report to ER immediately if symptom worsen or persist

## 2023-08-11 NOTE — PROGRESS NOTES
Subjective     Patient ID: Niraj Diaz is a 63 y.o. male.    Chief Complaint: Cough, Fatigue, and Headache    Sinus Problem  This is a new problem. The current episode started 1 to 4 weeks ago (x 2 w). The problem is unchanged. Maximum temperature: 99.4 per pt report. The fever has been present for 1 to 2 days. The pain is mild. Associated symptoms include congestion, coughing, headaches, sinus pressure, a sore throat and swollen glands. Pertinent negatives include no chills, diaphoresis, ear pain, hoarse voice, neck pain, shortness of breath or sneezing. Treatments tried: claritin. The treatment provided no relief.     Past Medical History:   Diagnosis Date    Bulging lumbar disc     Hypertension      Social History     Socioeconomic History    Marital status:    Tobacco Use    Smoking status: Never    Smokeless tobacco: Never   Substance and Sexual Activity    Alcohol use: No    Drug use: No    Sexual activity: Yes     Partners: Female   Social History Narrative    [employer] - M x 31, lives in [facility], 4 kids     Past Surgical History:   Procedure Laterality Date    VASECTOMY         Review of Systems   Constitutional: Negative.  Negative for chills and diaphoresis.   HENT:  Positive for nasal congestion, postnasal drip, rhinorrhea, sinus pressure/congestion and sore throat. Negative for ear pain, hoarse voice and sneezing.    Eyes: Negative.    Respiratory:  Positive for cough. Negative for shortness of breath.    Cardiovascular: Negative.    Gastrointestinal: Negative.    Endocrine: Negative.    Genitourinary: Negative.    Musculoskeletal: Negative.  Negative for neck pain.   Integumentary:  Negative.   Allergic/Immunologic: Negative.    Neurological:  Positive for headaches.   Psychiatric/Behavioral: Negative.            Objective     Physical Exam  Vitals and nursing note reviewed.   Constitutional:       Appearance: Normal appearance.   HENT:      Head: Normocephalic.      Right Ear: Hearing,  tympanic membrane, ear canal and external ear normal.      Left Ear: Hearing, tympanic membrane, ear canal and external ear normal.      Nose: Congestion present.      Mouth/Throat:      Mouth: Mucous membranes are moist.      Pharynx: Posterior oropharyngeal erythema present. No pharyngeal swelling, oropharyngeal exudate or uvula swelling.   Eyes:      Conjunctiva/sclera: Conjunctivae normal.      Pupils: Pupils are equal, round, and reactive to light.   Cardiovascular:      Rate and Rhythm: Regular rhythm.      Pulses: Normal pulses.      Heart sounds: Normal heart sounds.   Pulmonary:      Effort: Pulmonary effort is normal.      Breath sounds: Normal breath sounds.   Abdominal:      General: Bowel sounds are normal.      Palpations: Abdomen is soft.   Musculoskeletal:         General: Normal range of motion.      Cervical back: Normal range of motion and neck supple.   Skin:     General: Skin is warm and dry.      Capillary Refill: Capillary refill takes 2 to 3 seconds.   Neurological:      Mental Status: He is alert and oriented to person, place, and time.   Psychiatric:         Mood and Affect: Mood normal.         Behavior: Behavior normal.         Thought Content: Thought content normal.         Judgment: Judgment normal.            Assessment and Plan     1. Sinusitis, unspecified chronicity, unspecified location  2. Cough, unspecified type  3. Post-nasal drip  4. Pharyngitis, unspecified etiology  -     POCT COVID-19 Rapid Screening  -     POCT Influenza A/B Molecular         -     promethazine-dextromethorphan (PROMETHAZINE-DM) 6.25-15 mg/5 mL Syrp; Take 5 mLs by mouth 2 (two) times daily as needed.  Dispense: 118 mL; Refill: 0  -     azelastine (ASTELIN) 137 mcg (0.1 %) nasal spray; 1 spray (137 mcg total) by Nasal route 2 (two) times daily. for 7 days  Dispense: 30 mL; Refill: 0  -     cephALEXin (KEFLEX) 500 MG capsule; Take 1 capsule (500 mg total) by mouth every 12 (twelve) hours. for 10 days   Dispense: 20 capsule; Refill: 0  Hydrate well  Rest  Tylenol OTC as directed  F/U with PCP ASAP  Report to ER immediately if symptom worsen or persist               No follow-ups on file.

## 2023-09-05 RX ORDER — METOPROLOL SUCCINATE 50 MG/1
50 TABLET, EXTENDED RELEASE ORAL DAILY
Qty: 90 TABLET | Refills: 3 | Status: SHIPPED | OUTPATIENT
Start: 2023-09-05

## 2023-11-28 DIAGNOSIS — M79.642 LEFT HAND PAIN: Primary | ICD-10-CM

## 2023-12-04 ENCOUNTER — OFFICE VISIT (OUTPATIENT)
Dept: ORTHOPEDICS | Facility: CLINIC | Age: 63
End: 2023-12-04
Payer: COMMERCIAL

## 2023-12-04 ENCOUNTER — HOSPITAL ENCOUNTER (OUTPATIENT)
Dept: RADIOLOGY | Facility: HOSPITAL | Age: 63
Discharge: HOME | End: 2023-12-04
Attending: STUDENT IN AN ORGANIZED HEALTH CARE EDUCATION/TRAINING PROGRAM
Payer: COMMERCIAL

## 2023-12-04 DIAGNOSIS — M79.642 LEFT HAND PAIN: ICD-10-CM

## 2023-12-04 DIAGNOSIS — M65.322 TRIGGER INDEX FINGER OF LEFT HAND: Primary | ICD-10-CM

## 2023-12-04 PROCEDURE — 73130 XR HAND COMPLETE 3 VIEW LEFT: ICD-10-PCS | Mod: 26,LT,, | Performed by: RADIOLOGY

## 2023-12-04 PROCEDURE — 73130 X-RAY EXAM OF HAND: CPT | Mod: 26,LT,, | Performed by: RADIOLOGY

## 2023-12-04 PROCEDURE — 4010F ACE/ARB THERAPY RXD/TAKEN: CPT | Mod: CPTII,S$GLB,, | Performed by: STUDENT IN AN ORGANIZED HEALTH CARE EDUCATION/TRAINING PROGRAM

## 2023-12-04 PROCEDURE — 1159F MED LIST DOCD IN RCRD: CPT | Mod: CPTII,S$GLB,, | Performed by: STUDENT IN AN ORGANIZED HEALTH CARE EDUCATION/TRAINING PROGRAM

## 2023-12-04 PROCEDURE — 4010F PR ACE/ARB THEARPY RXD/TAKEN: ICD-10-PCS | Mod: CPTII,S$GLB,, | Performed by: STUDENT IN AN ORGANIZED HEALTH CARE EDUCATION/TRAINING PROGRAM

## 2023-12-04 PROCEDURE — 99999 PR PBB SHADOW E&M-EST. PATIENT-LVL III: CPT | Mod: PBBFAC,,, | Performed by: STUDENT IN AN ORGANIZED HEALTH CARE EDUCATION/TRAINING PROGRAM

## 2023-12-04 PROCEDURE — 1159F PR MEDICATION LIST DOCUMENTED IN MEDICAL RECORD: ICD-10-PCS | Mod: CPTII,S$GLB,, | Performed by: STUDENT IN AN ORGANIZED HEALTH CARE EDUCATION/TRAINING PROGRAM

## 2023-12-04 PROCEDURE — 99999 PR PBB SHADOW E&M-EST. PATIENT-LVL III: ICD-10-PCS | Mod: PBBFAC,,, | Performed by: STUDENT IN AN ORGANIZED HEALTH CARE EDUCATION/TRAINING PROGRAM

## 2023-12-04 PROCEDURE — 99203 OFFICE O/P NEW LOW 30 MIN: CPT | Mod: S$GLB,,, | Performed by: STUDENT IN AN ORGANIZED HEALTH CARE EDUCATION/TRAINING PROGRAM

## 2023-12-04 PROCEDURE — 99203 PR OFFICE/OUTPT VISIT, NEW, LEVL III, 30-44 MIN: ICD-10-PCS | Mod: S$GLB,,, | Performed by: STUDENT IN AN ORGANIZED HEALTH CARE EDUCATION/TRAINING PROGRAM

## 2023-12-04 PROCEDURE — 73130 X-RAY EXAM OF HAND: CPT | Mod: TC,PO,LT

## 2023-12-04 NOTE — PROGRESS NOTES
Patient ID: Niraj Diaz  YOB: 1960  MRN: [id number]    Chief Complaint: Left index trigger finger      Referred By: self    History of Present Illness: Niraj Diaz is a right-hand dominant 63 y.o. male who presents today with left index trigger finger. Patient reports index finger clicking. Achy pain with bending. Anything warm to the finger helps. He wants to avoid surgery. He has not tried any conservative treatment.    The patient is active in none.  Occupation: IT      Past Medical History:   Past Medical History:   Diagnosis Date    Bulging lumbar disc     Hypertension      Past Surgical History:   Procedure Laterality Date    VASECTOMY       History reviewed. No pertinent family history.  Social History     Socioeconomic History    Marital status:    Tobacco Use    Smoking status: Never    Smokeless tobacco: Never   Substance and Sexual Activity    Alcohol use: No    Drug use: No    Sexual activity: Yes     Partners: Female   Social History Narrative    [employer] - M x 31, lives in [facility], 4 kids     Medication List with Changes/Refills   Current Medications    ASPIRIN 325 MG TABLET    Take 325 mg by mouth once daily.    ATORVASTATIN (LIPITOR) 40 MG TABLET    Take 1 tablet (40 mg total) by mouth once daily.    ATORVASTATIN (LIPITOR) 40 MG TABLET    Take 1 tablet (40 mg total) by mouth once daily.    AZELASTINE (ASTELIN) 137 MCG (0.1 %) NASAL SPRAY    1 spray (137 mcg total) by Nasal route 2 (two) times daily. for 7 days    CLOPIDOGREL (PLAVIX) 75 MG TABLET    TAKE 1 TABLET BY MOUTH EVERY DAY    CLOPIDOGREL (PLAVIX) 75 MG TABLET    Take 1 tablet (75 mg total) by mouth once daily.    METOPROLOL SUCCINATE (TOPROL-XL) 50 MG 24 HR TABLET    TAKE 1 TABLET BY MOUTH EVERY DAY    METOPROLOL SUCCINATE (TOPROL-XL) 50 MG 24 HR TABLET    Take 1 tablet (50 mg total) by mouth once daily.    NITROGLYCERIN (NITROSTAT) 0.4 MG SL TABLET    Place 1 tablet (0.4 mg total) under the tongue  every 5 (five) minutes as needed for Chest pain.    TRIAMCINOLONE ACETONIDE 0.5% (KENALOG) 0.5 % CREA    Apply topically 2 (two) times daily.    VALSARTAN (DIOVAN) 80 MG TABLET    Take 1 tablet (80 mg total) by mouth once daily.    VALSARTAN (DIOVAN) 80 MG TABLET    Take 1 tablet (80 mg total) by mouth once daily.     Review of patient's allergies indicates:  No Known Allergies    Physical Exam:   There is no height or weight on file to calculate BMI.    GENERAL: Well appearing, in no acute distress.  HEAD: Normocephalic and atraumatic.  ENT: External ears and nose grossly normal.  EYES: EOMI bilaterally  PULMONARY: Respirations are grossly even and non-labored.  NEURO: Awake, alert, and oriented x 3.  SKIN: No obvious rashes appreciated.  PSYCH: Mood & affect are appropriate.    Detailed MSK exam:     Left hand/wrist exam:   -TTP: 2nd MCP with active triggering  -Swelling/ecchymosis: none  -Full ROM  - strength 5/5  -Sensation intact  -Pulses 2+  -Rotational deformity: negative  -Tinel sign: negative  -Phalen sign: negative  -Finkelstein sign: negative      Imaging:  X-Ray Hand Complete Left  EXAM: XR HAND COMPLETE 3 VIEW LEFT    CLINICAL HISTORY: [M79.642]-Pain in left hand.    FINDINGS:  3 views of left hand.  No acute fracture or dislocation.  No significant degenerative changes.    IMPRESSION:  No acute finding.    Finalized on: 12/4/2023 3:03 PM By:  Rob Diaz MD  [facility]# [id number]      2023-12-04 15:05:[id number]    [facility]        Relevant imaging results were reviewed and interpreted by me and per my read shows no acute abnormalities.  This was discussed with the patient and / or family today.     Assessment:  Niraj Diaz is a 63 y.o. male presenting with left index finger triggering.   History, physical and radiographs are consistent with a likely diagnosis of trigger finger.   Plan: voltaren gel, OTC trigger finger splint as needed. Consider steroid injection if not improving. Continue conservative  management for pain.   Follow up as needed. All questions answered.      Trigger index finger of left hand             A copy of today's visit note has been sent to the referring provider.     Electronically signed:  Isidro Gamboa MD, MPH  12/04/2023  3:07 PM

## 2023-12-04 NOTE — PATIENT INSTRUCTIONS
Assessment:  Niraj Diaz is a 63 y.o. male No chief complaint on file.      Encounter Diagnosis   Name Primary?    Trigger index finger of left hand Yes        Plan:  Patient will call office if he decides on trigger finger injection.  Apply topical diclofenac (Voltaren) up to 4 times a day to the affected area.  It can be bought over the counter at any local pharmacy.    Patient can purchase over the counter splint.    Follow-up: as needed.    Thank you for choosing [facility] and Dr. Isidro Gamboa for your orthopedic & sports medicine care. It is our goal to provide you with exceptional care that will help keep you healthy, active, and get you back in the game.    Please do not hesitate to reach out to us via email, phone, or MyChart with any questions, concerns, or feedback.    If you felt that you received exemplary care today, please consider leaving us feedback on [employer] at:  [email or web address]    If you are experiencing pain/discomfort ,or have questions after 5pm and would like to be connected to the [facility] on-call team, please call this number and specify which Sports Medicine provider is treating you: [phone]

## 2024-03-02 RX ORDER — ATORVASTATIN CALCIUM 40 MG/1
40 TABLET, FILM COATED ORAL DAILY
Qty: 90 TABLET | Refills: 2 | Status: CANCELLED | OUTPATIENT
Start: 2024-03-02

## 2024-04-24 RX ORDER — ATORVASTATIN CALCIUM 40 MG/1
40 TABLET, FILM COATED ORAL DAILY
Qty: 90 TABLET | Refills: 2 | Status: SHIPPED | OUTPATIENT
Start: 2024-04-24

## 2024-05-24 DIAGNOSIS — I10 HYPERTENSION, ESSENTIAL: Primary | ICD-10-CM

## 2024-05-24 RX ORDER — VALSARTAN 80 MG/1
80 TABLET ORAL DAILY
Qty: 90 TABLET | Refills: 4 | Status: SHIPPED | OUTPATIENT
Start: 2024-05-24

## 2024-07-12 DIAGNOSIS — Z95.5 STENTED CORONARY ARTERY: ICD-10-CM

## 2024-07-12 DIAGNOSIS — I10 HYPERTENSION, ESSENTIAL: Primary | ICD-10-CM

## 2024-07-18 RX ORDER — CLOPIDOGREL BISULFATE 75 MG/1
75 TABLET ORAL DAILY
Qty: 90 TABLET | Refills: 4 | Status: SHIPPED | OUTPATIENT
Start: 2024-07-18

## 2024-08-22 RX ORDER — METOPROLOL SUCCINATE 50 MG/1
50 TABLET, EXTENDED RELEASE ORAL DAILY
Qty: 90 TABLET | Refills: 3 | OUTPATIENT
Start: 2024-08-22

## 2024-11-04 ENCOUNTER — TELEPHONE (OUTPATIENT)
Dept: FAMILY MEDICINE | Facility: CLINIC | Age: 64
End: 2024-11-04
Payer: COMMERCIAL

## 2024-11-04 DIAGNOSIS — Z79.899 ENCOUNTER FOR LONG-TERM (CURRENT) USE OF MEDICATIONS: ICD-10-CM

## 2024-11-04 DIAGNOSIS — Z11.59 NEED FOR HEPATITIS C SCREENING TEST: ICD-10-CM

## 2024-11-04 DIAGNOSIS — Z00.00 ENCOUNTER FOR MEDICAL EXAMINATION TO ESTABLISH CARE: Primary | ICD-10-CM

## 2024-11-04 DIAGNOSIS — Z12.5 SCREENING PSA (PROSTATE SPECIFIC ANTIGEN): ICD-10-CM

## 2024-11-04 DIAGNOSIS — Z11.4 SCREENING FOR HIV (HUMAN IMMUNODEFICIENCY VIRUS): ICD-10-CM

## 2024-11-04 DIAGNOSIS — Z13.220 LIPID SCREENING: ICD-10-CM

## 2024-11-04 NOTE — TELEPHONE ENCOUNTER
----- Message from VDI Spacebeto sent at 11/4/2024  9:12 AM CST -----  Contact: Niraj  .Type:  Patient Returning Call    Who Called: Niraj   Who Left Message for Patient: nurse   Does the patient know what this is regarding?: yes   Would the patient rather a call back or a response via [facility]?  Call back   Best Call Back Number: .[phone] (home)    Additional Information: Lab orders to be drawn at the [facility]       Thanks

## 2024-11-05 ENCOUNTER — LAB VISIT (OUTPATIENT)
Dept: LAB | Facility: HOSPITAL | Age: 64
End: 2024-11-05
Attending: FAMILY MEDICINE
Payer: COMMERCIAL

## 2024-11-05 DIAGNOSIS — Z12.5 SCREENING PSA (PROSTATE SPECIFIC ANTIGEN): ICD-10-CM

## 2024-11-05 DIAGNOSIS — Z11.4 SCREENING FOR HIV (HUMAN IMMUNODEFICIENCY VIRUS): ICD-10-CM

## 2024-11-05 DIAGNOSIS — Z11.59 NEED FOR HEPATITIS C SCREENING TEST: ICD-10-CM

## 2024-11-05 DIAGNOSIS — Z79.899 ENCOUNTER FOR LONG-TERM (CURRENT) USE OF MEDICATIONS: ICD-10-CM

## 2024-11-05 DIAGNOSIS — Z13.220 LIPID SCREENING: ICD-10-CM

## 2024-11-05 DIAGNOSIS — Z00.00 ENCOUNTER FOR MEDICAL EXAMINATION TO ESTABLISH CARE: ICD-10-CM

## 2024-11-05 LAB
ALBUMIN SERPL BCP-MCNC: 3.9 G/DL (ref 3.5–5.2)
ALP SERPL-CCNC: 72 U/L (ref 40–150)
ALT SERPL W/O P-5'-P-CCNC: 35 U/L (ref 10–44)
ANION GAP SERPL CALC-SCNC: 9 MMOL/L (ref 8–16)
AST SERPL-CCNC: 29 U/L (ref 10–40)
BILIRUB SERPL-MCNC: 0.8 MG/DL (ref 0.1–1)
BUN SERPL-MCNC: 8 MG/DL (ref 8–23)
CALCIUM SERPL-MCNC: 9.8 MG/DL (ref 8.7–10.5)
CHLORIDE SERPL-SCNC: 107 MMOL/L (ref 95–110)
CHOLEST SERPL-MCNC: 121 MG/DL (ref 120–199)
CHOLEST/HDLC SERPL: 2.8 {RATIO} (ref 2–5)
CO2 SERPL-SCNC: 25 MMOL/L (ref 23–29)
COMPLEXED PSA SERPL-MCNC: 0.57 NG/ML (ref 0–4)
CREAT SERPL-MCNC: 0.9 MG/DL (ref 0.5–1.4)
ERYTHROCYTE [DISTWIDTH] IN BLOOD BY AUTOMATED COUNT: 12.7 % (ref 11.5–14.5)
EST. GFR  (NO RACE VARIABLE): >60 ML/MIN/1.73 M^2
ESTIMATED AVG GLUCOSE: 108 MG/DL (ref 68–131)
GLUCOSE SERPL-MCNC: 107 MG/DL (ref 70–110)
HBA1C MFR BLD: 5.4 % (ref 4–5.6)
HCT VFR BLD AUTO: 46.2 % (ref 40–54)
HCV AB SERPL QL IA: NORMAL
HDLC SERPL-MCNC: 43 MG/DL (ref 40–75)
HDLC SERPL: 35.5 % (ref 20–50)
HGB BLD-MCNC: 15.4 G/DL (ref 14–18)
HIV 1+2 AB+HIV1 P24 AG SERPL QL IA: NORMAL
LDLC SERPL CALC-MCNC: 54 MG/DL (ref 63–159)
MCH RBC QN AUTO: 29.8 PG (ref 27–31)
MCHC RBC AUTO-ENTMCNC: 33.3 G/DL (ref 32–36)
MCV RBC AUTO: 90 FL (ref 82–98)
NONHDLC SERPL-MCNC: 78 MG/DL
PLATELET # BLD AUTO: 284 K/UL (ref 150–450)
PMV BLD AUTO: 9.2 FL (ref 9.2–12.9)
POTASSIUM SERPL-SCNC: 5 MMOL/L (ref 3.5–5.1)
PROT SERPL-MCNC: 7.1 G/DL (ref 6–8.4)
RBC # BLD AUTO: 5.16 M/UL (ref 4.6–6.2)
SODIUM SERPL-SCNC: 141 MMOL/L (ref 136–145)
TRIGL SERPL-MCNC: 120 MG/DL (ref 30–150)
TSH SERPL DL<=0.005 MIU/L-ACNC: 1.53 UIU/ML (ref 0.4–4)
WBC # BLD AUTO: 7.63 K/UL (ref 3.9–12.7)

## 2024-11-05 PROCEDURE — 80061 LIPID PANEL: CPT | Performed by: FAMILY MEDICINE

## 2024-11-05 PROCEDURE — 87389 HIV-1 AG W/HIV-1&-2 AB AG IA: CPT | Performed by: FAMILY MEDICINE

## 2024-11-05 PROCEDURE — 84153 ASSAY OF PSA TOTAL: CPT | Performed by: FAMILY MEDICINE

## 2024-11-05 PROCEDURE — 83036 HEMOGLOBIN GLYCOSYLATED A1C: CPT | Performed by: FAMILY MEDICINE

## 2024-11-05 PROCEDURE — 86803 HEPATITIS C AB TEST: CPT | Performed by: FAMILY MEDICINE

## 2024-11-05 PROCEDURE — 36415 COLL VENOUS BLD VENIPUNCTURE: CPT | Mod: PO | Performed by: FAMILY MEDICINE

## 2024-11-05 PROCEDURE — 80053 COMPREHEN METABOLIC PANEL: CPT | Performed by: FAMILY MEDICINE

## 2024-11-05 PROCEDURE — 84443 ASSAY THYROID STIM HORMONE: CPT | Performed by: FAMILY MEDICINE

## 2024-11-05 PROCEDURE — 85027 COMPLETE CBC AUTOMATED: CPT | Performed by: FAMILY MEDICINE

## 2024-11-05 NOTE — TELEPHONE ENCOUNTER
I have signed for the following orders AND/OR meds.  Please call the patient and ask the patient to schedule the testing AND/OR inform about any medications that were sent.      Orders Placed This Encounter   Procedures    Lipid Panel     Standing Status:   Future     Standing Expiration Date:   1/3/2026    Hepatitis C Antibody     Standing Status:   Future     Standing Expiration Date:   1/3/2026    HIV 1/2 Ag/Ab (4th Gen)     Standing Status:   Future     Standing Expiration Date:   1/3/2026    Hemoglobin A1C     Standing Status:   Future     Standing Expiration Date:   1/3/2026    CBC Without Differential     Standing Status:   Future     Standing Expiration Date:   1/3/2026    Comprehensive Metabolic Panel     Standing Status:   Future     Standing Expiration Date:   1/3/2026    TSH     Standing Status:   Future     Standing Expiration Date:   1/3/2026    PSA, SCREENING     Standing Status:   Future     Standing Expiration Date:   2/2/2026     Order Specific Question:   Send normal result to authorizing provider's In Basket if patient is active on MyChart:     Answer:   Yes

## 2024-11-06 ENCOUNTER — OFFICE VISIT (OUTPATIENT)
Dept: FAMILY MEDICINE | Facility: CLINIC | Age: 64
End: 2024-11-06
Payer: COMMERCIAL

## 2024-11-06 ENCOUNTER — LAB VISIT (OUTPATIENT)
Dept: LAB | Facility: HOSPITAL | Age: 64
End: 2024-11-06
Attending: FAMILY MEDICINE
Payer: COMMERCIAL

## 2024-11-06 VITALS
HEART RATE: 74 BPM | OXYGEN SATURATION: 97 % | DIASTOLIC BLOOD PRESSURE: 88 MMHG | WEIGHT: 242.38 LBS | SYSTOLIC BLOOD PRESSURE: 132 MMHG | BODY MASS INDEX: 36.73 KG/M2 | HEIGHT: 68 IN

## 2024-11-06 DIAGNOSIS — E66.2 CLASS 2 OBESITY WITH ALVEOLAR HYPOVENTILATION, SERIOUS COMORBIDITY, AND BODY MASS INDEX (BMI) OF 36.0 TO 36.9 IN ADULT: ICD-10-CM

## 2024-11-06 DIAGNOSIS — Z23 NEED FOR VACCINATION: ICD-10-CM

## 2024-11-06 DIAGNOSIS — Z13.220 ENCOUNTER FOR LIPID SCREENING FOR CARDIOVASCULAR DISEASE: ICD-10-CM

## 2024-11-06 DIAGNOSIS — Z86.73 HISTORY OF STROKE: ICD-10-CM

## 2024-11-06 DIAGNOSIS — E66.812 CLASS 2 OBESITY WITH ALVEOLAR HYPOVENTILATION, SERIOUS COMORBIDITY, AND BODY MASS INDEX (BMI) OF 36.0 TO 36.9 IN ADULT: ICD-10-CM

## 2024-11-06 DIAGNOSIS — Z95.5 HISTORY OF CORONARY ARTERY STENT PLACEMENT: ICD-10-CM

## 2024-11-06 DIAGNOSIS — Z79.899 ENCOUNTER FOR LONG-TERM (CURRENT) USE OF MEDICATIONS: ICD-10-CM

## 2024-11-06 DIAGNOSIS — Z13.6 ENCOUNTER FOR LIPID SCREENING FOR CARDIOVASCULAR DISEASE: ICD-10-CM

## 2024-11-06 DIAGNOSIS — K90.49 FOOD INTOLERANCE: ICD-10-CM

## 2024-11-06 DIAGNOSIS — E16.2 HYPOGLYCEMIA: ICD-10-CM

## 2024-11-06 DIAGNOSIS — Z00.00 ENCOUNTER FOR MEDICAL EXAMINATION TO ESTABLISH CARE: Primary | ICD-10-CM

## 2024-11-06 PROBLEM — R29.898 LEFT ARM WEAKNESS: Status: ACTIVE | Noted: 2022-06-07

## 2024-11-06 PROBLEM — R91.1 NODULE OF UPPER LOBE OF RIGHT LUNG: Status: ACTIVE | Noted: 2024-11-06

## 2024-11-06 PROCEDURE — 4010F ACE/ARB THERAPY RXD/TAKEN: CPT | Mod: CPTII,S$GLB,, | Performed by: FAMILY MEDICINE

## 2024-11-06 PROCEDURE — 36415 COLL VENOUS BLD VENIPUNCTURE: CPT | Mod: PO | Performed by: FAMILY MEDICINE

## 2024-11-06 PROCEDURE — 90471 IMMUNIZATION ADMIN: CPT | Mod: S$GLB,,, | Performed by: FAMILY MEDICINE

## 2024-11-06 PROCEDURE — 3008F BODY MASS INDEX DOCD: CPT | Mod: CPTII,S$GLB,, | Performed by: FAMILY MEDICINE

## 2024-11-06 PROCEDURE — 1159F MED LIST DOCD IN RCRD: CPT | Mod: CPTII,S$GLB,, | Performed by: FAMILY MEDICINE

## 2024-11-06 PROCEDURE — 99999 PR PBB SHADOW E&M-EST. PATIENT-LVL IV: CPT | Mod: PBBFAC,,, | Performed by: FAMILY MEDICINE

## 2024-11-06 PROCEDURE — 3288F FALL RISK ASSESSMENT DOCD: CPT | Mod: CPTII,S$GLB,, | Performed by: FAMILY MEDICINE

## 2024-11-06 PROCEDURE — 3079F DIAST BP 80-89 MM HG: CPT | Mod: CPTII,S$GLB,, | Performed by: FAMILY MEDICINE

## 2024-11-06 PROCEDURE — 99397 PER PM REEVAL EST PAT 65+ YR: CPT | Mod: 25,S$GLB,, | Performed by: FAMILY MEDICINE

## 2024-11-06 PROCEDURE — 3044F HG A1C LEVEL LT 7.0%: CPT | Mod: CPTII,S$GLB,, | Performed by: FAMILY MEDICINE

## 2024-11-06 PROCEDURE — 90653 IIV ADJUVANT VACCINE IM: CPT | Mod: S$GLB,,, | Performed by: FAMILY MEDICINE

## 2024-11-06 PROCEDURE — 86258 DGP ANTIBODY EACH IG CLASS: CPT | Performed by: FAMILY MEDICINE

## 2024-11-06 PROCEDURE — 1101F PT FALLS ASSESS-DOCD LE1/YR: CPT | Mod: CPTII,S$GLB,, | Performed by: FAMILY MEDICINE

## 2024-11-06 PROCEDURE — 3075F SYST BP GE 130 - 139MM HG: CPT | Mod: CPTII,S$GLB,, | Performed by: FAMILY MEDICINE

## 2024-11-06 RX ORDER — GLUCOSAMINE/CHONDR SU A SOD 750-600 MG
TABLET ORAL
COMMUNITY

## 2024-11-06 NOTE — PROGRESS NOTES
This note is specifically for wellness visit performed today.   WELLNESS EXAM    Patient ID: Niraj Diaz is a 64 y.o. male.  has a past medical history of Bulging lumbar disc, Heart attack, Hypertension, and Stroke.   Chief Complaint:  Encounter for wellness exam    Well Adult Physical: Patient here for a comprehensive physical exam.The patient reports chronic problems.  History of Present Illness  The patient is a 64-year-old male here to establish care.    He has had blood work performed prior to the visit. His cholesterol and lipid panel are well controlled on Lipitor 40 mg daily, with an LDL of 54. His PSA screening test is normal, with a repeat recommended in 1 year. Hepatitis C and HIV screenings are nonreactive. His complete blood count is normal with no anemia. His A1c is normal. Thyroid screening test (TSH) is normal. The comprehensive metabolic panel shows normal kidney function, liver function, and electrolytes.    He reports feeling well overall but suspects a gluten allergy. He experienced a severe reaction after consuming [place] of [place] bread with butter two months ago, which led him to eliminate gluten from his diet. Since then, he has felt better.    He is interested in receiving an influenza vaccine today.    He also experienced a hypoglycemic event at [employer], which was alleviated by consuming sugar water. He is unsure of the cause as he has never experienced this before.    He has a history of a mild heart attack, which presented with different symptoms. He has had two heart attacks, one of which occurred several years ago while he was cutting trees at a friend's house. He became overheated, sweaty, clammy, and experienced palpitations. He also had a cardiac event while working at a [employer], which resulted in an elevated troponin level. He has three stents and is currently taking Plavix, a statin, and aspirin.    He also experienced a stroke, which was confirmed by imaging, but he has no residual  effects. He is currently under the care of Ester Olmos NP and takes baby aspirin daily.    He drinks four bottles of water daily and has concerns about aspartame.    He has never had a colonoscopy and does not have a gastroenterologist. He sees Dr. Magaña once or twice a year and is due for another stress test.    He has stopped using Astelin nasal spray and triamcinolone cream. He is currently taking over-the-counter glucosamine and a multivitamin. He has been monitoring his diet and remains active.    FAMILY HISTORY  His father  of prostate cancer at age 77. His mother lived to be 96 and had heart attacks, knee surgeries, arthritis, and high blood pressure. His maternal grandfather had a massive coronary thrombosis in .     CHRONIC. STABLE. Lab analysis reviewed.   (-) CP, SOB, abdominal pain, N/V/D, constipation, jaundice, skin changes.  (-) Myalgias  Lab Results   Component Value Date    CHOL 121 2024    CHOL 133 2022    CHOL 226 (H) 10/29/2013     Lab Results   Component Value Date    HDL 43 2024    HDL 48 2022    HDL 49 10/29/2013     Lab Results   Component Value Date    LDLCALC 54.0 (L) 2024    LDLCALC 67 2022    LDLCALC 156.6 10/29/2013     Lab Results   Component Value Date    TRIG 120 2024    TRIG 88 2022    TRIG 102 10/29/2013     Lab Results   Component Value Date    CHOLHDL 35.5 2024    CHOLHDL 2.8 2022    CHOLHDL 21.7 10/29/2013     Lab Results   Component Value Date    TOTALCHOLEST 2.8 2024    TOTALCHOLEST 4.6 10/29/2013    TOTALCHOLEST 4.6 2009     Lab Results   Component Value Date    ALT 35 2024    AST 29 2024    ALKPHOS 72 2024    BILITOT 0.8 2024     ======================================================  CHRONIC. STABLE. BP Reviewed.  Compliant with BP medications. No SE reported.   (-) CP, SOB, palpitations, dizziness, lightheadedness, HA, arm numbness, tingling or weakness,  "syncope.  Creatinine   Date Value Ref Range Status   11/05/2024 0.9 0.5 - 1.4 mg/dL Final     Diabetes Management Status    Statin: Taking  ACE/ARB: Taking    Screening or Prevention Patient's value Goal Complete/Controlled?   HgA1C Testing and Control   Lab Results   Component Value Date    HGBA1C 5.4 11/05/2024      Annually/Less than 8% Yes   Lipid profile : 11/05/2024 Annually Yes   LDL control Lab Results   Component Value Date    LDLCALC 54.0 (L) 11/05/2024    Annually/Less than 100 mg/dl  Yes   Nephropathy screening No results found for: "LABMICR"  Lab Results   Component Value Date    PROTEINUA Negative 09/03/2017     No results found for: "UTPCR"   Annually No   Blood pressure BP Readings from Last 1 Encounters:   11/06/24 132/88    Less than 140/90 Yes   Dilated retinal exam Most Recent Eye Exam Date: Not Found Annually No   Foot exam   Most Recent Foot Exam Date: Not Found Annually No         Do you take any herbs or supplements that were not prescribed by a doctor? no   Are you taking calcium supplements? no    History:  Family history of prostate cancer father at age 77, father had prostatectomy  Date last PSA: The natural history of prostate cancer and ongoing controversy regarding screening and potential treatment outcomes of prostate cancer has been discussed with the patient. The meaning of a false positive PSA and a false negative PSA has been discussed. He indicates understanding of the limitations of this screening test and wishes to proceed with screening PSA testing.  Lab Results   Component Value Date    PSA 0.57 11/05/2024    PSA 0.47 01/14/2009    PSA 0.6 07/24/2006      No results found for: "TESTOSTERONE" No results found for: "TESTOSTERONE", "TOTALTESTOST", "BIOTESTO"   Colon cancer screening:  Patient is overdue for colon cancer screening.  He declined/refuses colonoscopy at this time discussed risk and benefits of colon cancer screening in detail.  Patient does not want to proceed " with stool testing either.  He does complain of abdominal pain after eating bread and is concerned about gluten sensitivity and possibly celiac disease.  Explain to him the importance of having endoscopy with biopsies to confirm any pathology going on with his: However patient still declines.  Health Maintenance Topics with due status: Not Due       Topic Last Completion Date    Hemoglobin A1c (Diabetic Prevention Screening) 11/05/2024    Lipid Panel 11/05/2024    High Dose Statin 11/06/2024      ==============================================  History reviewed.   Health Maintenance Due   Topic Date Due    Pneumococcal Vaccines (Age 65+) (1 of 2 - PCV) Never done    TETANUS VACCINE  Never done       Past Medical History:  Past Medical History:   Diagnosis Date    Bulging lumbar disc     Heart attack     Hypertension     Stroke      Past Surgical History:   Procedure Laterality Date    VASECTOMY       Review of patient's allergies indicates:   Allergen Reactions    Gluten protein      Current Outpatient Medications on File Prior to Visit   Medication Sig Dispense Refill    atorvastatin (LIPITOR) 40 MG tablet Take 1 tablet (40 mg total) by mouth once daily. 90 tablet 2    clopidogreL (PLAVIX) 75 mg tablet Take 1 tablet (75 mg total) by mouth once daily. 90 tablet 4    metoprolol succinate (TOPROL-XL) 50 MG 24 hr tablet TAKE 1 TABLET BY MOUTH EVERY DAY 90 tablet 3    nitroGLYCERIN (NITROSTAT) 0.4 MG SL tablet Place 1 tablet (0.4 mg total) under the tongue every 5 (five) minutes as needed for Chest pain. 25 tablet 4    valsartan (DIOVAN) 80 MG tablet Take 1 tablet (80 mg total) by mouth once daily. 90 tablet 4    [DISCONTINUED] aspirin 325 MG tablet Take 325 mg by mouth once daily.      glucosamine HCl 1,500 mg Tab       multivit with minerals/lutein (MULTIVITAMIN 50 PLUS ORAL)       [DISCONTINUED] atorvastatin (LIPITOR) 40 MG tablet Take 1 tablet (40 mg total) by mouth once daily. 90 tablet 2    [DISCONTINUED]  azelastine (ASTELIN) 137 mcg (0.1 %) nasal spray 1 spray (137 mcg total) by Nasal route 2 (two) times daily. for 7 days (Patient not taking: Reported on 11/6/2024) 30 mL 0    [DISCONTINUED] clopidogreL (PLAVIX) 75 mg tablet TAKE 1 TABLET BY MOUTH EVERY DAY 90 tablet 4    [DISCONTINUED] metoprolol succinate (TOPROL-XL) 50 MG 24 hr tablet Take 1 tablet (50 mg total) by mouth once daily. 90 tablet 3    [DISCONTINUED] triamcinolone acetonide 0.5% (KENALOG) 0.5 % Crea Apply topically 2 (two) times daily. (Patient not taking: Reported on 11/6/2024) 30 g 0    [DISCONTINUED] valsartan (DIOVAN) 80 MG tablet Take 1 tablet (80 mg total) by mouth once daily. 90 tablet 4     No current facility-administered medications on file prior to visit.     Social History     Socioeconomic History    Marital status:    Tobacco Use    Smoking status: Never    Smokeless tobacco: Never    Tobacco comments:     2pack a day 40 years ago   Substance and Sexual Activity    Alcohol use: No    Drug use: No    Sexual activity: Yes     Partners: Female     Birth control/protection: Post-menopausal, None   Social History Narrative    [employer] - M x 31, lives in [facility], 4 kids     Social Drivers of Health     Financial Resource Strain: Low Risk  (11/5/2024)    Overall Financial Resource Strain (CARDIA)     Difficulty of Paying Living Expenses: Not hard at all   Food Insecurity: No Food Insecurity (11/5/2024)    Hunger Vital Sign     Worried About Running Out of Food in the Last Year: Never true     Ran Out of Food in the Last Year: Never true   Physical Activity: Insufficiently Active (11/5/2024)    Exercise Vital Sign     Days of Exercise per Week: 2 days     Minutes of Exercise per Session: 10 min   Stress: No Stress Concern Present (11/5/2024)    [employer] [place] [employer] - Occupational Stress Questionnaire     Feeling of Stress : Only a little   Housing Stability: Unknown (11/5/2024)    Housing Stability Vital Sign      Unable to Pay for Housing in the Last Year: No     Family History   Problem Relation Name Age of Onset    Hypertension Mother      Osteoarthritis Mother      Heart disease Mother      Prostate cancer Father      Hypertension Maternal Grandfather      Heart disease Maternal Grandfather         Review of Systems   Constitutional:  Negative for chills, fatigue, fever and unexpected weight change.   HENT:  Negative for ear pain and sore throat.    Eyes:  Negative for redness and visual disturbance.   Respiratory:  Negative for cough and shortness of breath.    Cardiovascular:  Negative for chest pain and palpitations.   Gastrointestinal:  Positive for abdominal pain. Negative for anal bleeding, blood in stool, constipation, diarrhea, nausea, rectal pain and vomiting.   Endocrine: Negative for cold intolerance and heat intolerance.   Genitourinary:  Negative for difficulty urinating and hematuria.   Musculoskeletal:  Negative for arthralgias and myalgias.   Skin:  Negative for rash and wound.   Allergic/Immunologic: Negative for environmental allergies and food allergies.   Neurological:  Negative for weakness and headaches.   Hematological:  Negative for adenopathy. Does not bruise/bleed easily.   Psychiatric/Behavioral:  Negative for sleep disturbance. The patient is not nervous/anxious.         Objective:     Vitals:    11/06/24 0820   BP: 132/88   Pulse: 74    Body mass index is 36.86 kg/m².  Physical Exam  Vitals and nursing note reviewed.   Constitutional:       General: He is not in acute distress.     Appearance: He is well-developed. He is not diaphoretic.   HENT:      Head: Normocephalic and atraumatic.      Right Ear: External ear normal.      Left Ear: External ear normal.      Nose: Nose normal. No rhinorrhea.   Eyes:      Extraocular Movements: Extraocular movements intact.      Pupils: Pupils are equal, round, and reactive to light.   Cardiovascular:      Rate and Rhythm: Normal rate.      Pulses: Normal  pulses.   Pulmonary:      Effort: Pulmonary effort is normal. No respiratory distress.      Breath sounds: Normal breath sounds.   Abdominal:      General: Bowel sounds are normal.      Palpations: Abdomen is soft.   Musculoskeletal:         General: Normal range of motion.      Cervical back: Normal range of motion and neck supple.   Skin:     General: Skin is warm and dry.      Capillary Refill: Capillary refill takes less than 2 seconds.      Findings: No rash.   Neurological:      General: No focal deficit present.      Mental Status: He is alert and oriented to person, place, and time.      Cranial Nerves: No cranial nerve deficit.      Motor: No weakness.      Gait: Gait normal.   Psychiatric:         Attention and Perception: He is attentive.         Mood and Affect: Mood normal. Mood is not anxious or depressed. Affect is not labile, blunt, angry or inappropriate.         Speech: He is communicative. Speech is not rapid and pressured, delayed, slurred or tangential.         Behavior: Behavior normal. Behavior is not agitated, slowed, aggressive, withdrawn, hyperactive or combative.         Thought Content: Thought content normal. Thought content is not paranoid or delusional. Thought content does not include homicidal or suicidal ideation. Thought content does not include homicidal or suicidal plan.         Cognition and Memory: Memory is not impaired.         Judgment: Judgment normal. Judgment is not impulsive or inappropriate.       deferred    Lab Visit on 11/05/2024   Component Date Value Ref Range Status    Cholesterol 11/05/2024 121  120 - 199 mg/dL Final    Comment: The National Cholesterol Education Program (NCEP) has set the  following guidelines (reference ranges) for Cholesterol:  Optimal.....................<200 mg/dL  Borderline High.............200-239 mg/dL  High........................> or = 240 mg/dL      Triglycerides 11/05/2024 120  30 - 150 mg/dL Final    Comment: The National  Cholesterol Education Program (NCEP) has set the  following guidelines (reference values) for triglycerides:  Normal......................<150 mg/dL  Borderline High.............150-199 mg/dL  High........................200-499 mg/dL      HDL 11/05/2024 43  40 - 75 mg/dL Final    Comment: The National Cholesterol Education Program (NCEP) has set the  following guidelines (reference values) for HDL Cholesterol:  Low...............<40 mg/dL  Optimal...........>60 mg/dL      LDL Cholesterol 11/05/2024 54.0 (L)  63.0 - 159.0 mg/dL Final    Comment: The National Cholesterol Education Program (NCEP) has set the  following guidelines (reference values) for LDL Cholesterol:  Optimal.......................<130 mg/dL  Borderline High...............130-159 mg/dL  High..........................160-189 mg/dL  Very High.....................>190 mg/dL      HDL/Cholesterol Ratio 11/05/2024 35.5  20.0 - 50.0 % Final    Total Cholesterol/HDL Ratio 11/05/2024 2.8  2.0 - 5.0 Final    Non-HDL Cholesterol 11/05/2024 78  mg/dL Final    Comment: Risk category and Non-HDL cholesterol goals:  Coronary heart disease (CHD)or equivalent (10-year risk of CHD >20%):  Non-HDL cholesterol goal     <130 mg/dL  Two or more CHD risk factors and 10-year risk of CHD <= 20%:  Non-HDL cholesterol goal     <160 mg/dL  0 to 1 CHD risk factor:  Non-HDL cholesterol goal     <190 mg/dL      Hepatitis C Ab 11/05/2024 Non-reactive  Non-reactive Final    HIV 1/2 Ag/Ab 11/05/2024 Non-reactive  Non-reactive Final    Hemoglobin A1C 11/05/2024 5.4  4.0 - 5.6 % Final    Comment: ADA Screening Guidelines:  5.7-6.4%  Consistent with prediabetes  >or=6.5%  Consistent with diabetes    High levels of fetal hemoglobin interfere with the HbA1C  assay. Heterozygous hemoglobin variants (HbS, HgC, etc)do  not significantly interfere with this assay.   However, presence of multiple variants may affect accuracy.      Estimated Avg Glucose 11/05/2024 108  68 - 131 mg/dL Final     WBC 11/05/2024 7.63  3.90 - 12.70 K/uL Final    RBC 11/05/2024 5.16  4.60 - 6.20 M/uL Final    Hemoglobin 11/05/2024 15.4  14.0 - 18.0 g/dL Final    Hematocrit 11/05/2024 46.2  40.0 - 54.0 % Final    MCV 11/05/2024 90  82 - 98 fL Final    MCH 11/05/2024 29.8  27.0 - 31.0 pg Final    MCHC 11/05/2024 33.3  32.0 - 36.0 g/dL Final    RDW 11/05/2024 12.7  11.5 - 14.5 % Final    Platelets 11/05/2024 284  150 - 450 K/uL Final    MPV 11/05/2024 9.2  9.2 - 12.9 fL Final    Sodium 11/05/2024 141  136 - 145 mmol/L Final    Potassium 11/05/2024 5.0  3.5 - 5.1 mmol/L Final    Chloride 11/05/2024 107  95 - 110 mmol/L Final    CO2 11/05/2024 25  23 - 29 mmol/L Final    Glucose 11/05/2024 107  70 - 110 mg/dL Final    BUN 11/05/2024 8  8 - 23 mg/dL Final    Creatinine 11/05/2024 0.9  0.5 - 1.4 mg/dL Final    Calcium 11/05/2024 9.8  8.7 - 10.5 mg/dL Final    Total Protein 11/05/2024 7.1  6.0 - 8.4 g/dL Final    Albumin 11/05/2024 3.9  3.5 - 5.2 g/dL Final    Total Bilirubin 11/05/2024 0.8  0.1 - 1.0 mg/dL Final    Comment: For infants and newborns, interpretation of results should be based  on gestational age, weight and in agreement with clinical  observations.    Premature Infant recommended reference ranges:  Up to 24 hours.............<8.0 mg/dL  Up to 48 hours............<12.0 mg/dL  3-5 days..................<15.0 mg/dL  6-29 days.................<15.0 mg/dL      Alkaline Phosphatase 11/05/2024 72  40 - 150 U/L Final    AST 11/05/2024 29  10 - 40 U/L Final    ALT 11/05/2024 35  10 - 44 U/L Final    eGFR 11/05/2024 >60.0  >60 mL/min/1.73 m^2 Final    Anion Gap 11/05/2024 9  8 - 16 mmol/L Final    TSH 11/05/2024 1.530  0.400 - 4.000 uIU/mL Final    PSA, Screen 11/05/2024 0.57  0.00 - 4.00 ng/mL Final    Comment: The testing method is a chemiluminescent microparticle immunoassay   manufactured by [employer] and performed on the [email or web address]   or   [employer] system. Values obtained with different assay manufacturers   for    methods may be different and cannot be used interchangeably.  PSA Expected levels:  Hormonal Therapy: <0.05 ng/ml  Prostatectomy: <0.01 ng/ml  Radiation Therapy: <1.00 ng/ml          Assessment / Plan:    1.  Routine health exam-patient here for annual wellness exam.  Labs ordered.  Health maintenance was reviewed and ordered.  Anticipatory guidance: Don't smoke.  Healthy diet and regular exercise recommended. Vaccine recommendations discussed.  See orders.  Reviewed Anticipatory guidance, risk factor reduction interventions or counseling as needed, Complete history , physical was completed today.  Complete and thorough medication reconciliation was performed.  Discussed risks and benefits of medications.  Advised patient on orders and health maintenance.  We discussed old records and old labs if available.  Will request any records not available through epic.  Continue current medications listed on your summary sheet.  All questions were answered. Patient had no further concerns. Advised of diagnoses and plan. Follow up as planned or return sooner if symptoms persist or worsen.   Assessment & Plan  1. Establishment of care.  His LDL is well-managed on Lipitor 40 mg daily, and both kidney and liver functions are normal. His A1c has shown improvement, indicating a decrease in prediabetic risk. His average blood sugar level is within the normal range. He has tested negative for hepatitis C and HIV. His PSA and thyroid levels are normal, and his blood counts are within the normal range, indicating no anemia. His blood pressure is well-controlled. He was advised to consume small, frequent meals and avoid prolonged fasting to prevent hypoglycemia. He was also advised to keep a source of glucose readily available. He declined a colonoscopy at this time. A celiac panel will be ordered. He will receive an influenza vaccine today. His A1c will be rechecked in 3 to 6 months. He was advised to follow up with his cardiologist  and neurologist.  ER precautions for severe symptoms.    2. Suspected gluten allergy.  He reported symptoms suggestive of a gluten allergy after consuming [place] bread. He has been avoiding gluten for the past two months and feels better. A celiac panel will be ordered to differentiate between gluten allergy and celiac disease. He was advised to follow up with a gastroenterologist if needed.    3. History of mild heart attack- history of coronary stents  He had a mild heart attack several years ago and had three stents placed. He is currently on Plavix, statin, and aspirin. He was advised to continue these medications and follow up with his cardiologist for regular monitoring.    4. History of stroke.  He experienced a stroke in the past but has no residual effects. He was advised to stay on the neurologist's panel for continued monitoring and to follow up with the neurologist by next year.  Reduce stroke risk factors.  Blood pressure is well controlled, lipid panel shows LDL less than 70 on statin.    5. Health Maintenance.  He will receive an influenza vaccine today. His PSA will be checked yearly. He was advised to consider a colonoscopy for colon cancer screening, but he declined at this time.      Counseled on hyperlipidemia disease course, healthy diet and increased need for exercise.  Please be advised of the risk of cardiovascular disease, increase stroke and heart attack risk with uncontrolled/untreated hyperlipidemia.     Patient voiced understanding and understood the treatment plan. All questions were answered.   Counseled on importance of hypertension disease course, I recommend ongoing Education for DASH-diet and exercise.  Counseled on medication regimen importance of treating high blood pressure.  Please be advised of risk of untreated blood pressure as discussed.  Please advised of ER precautions were given for symptoms of hypertensive urgency and emergency.  We will plan to monitor hemoglobin A1c  at designated intervals 3 to 6 months.  I recommend ongoing Education for diabetic diet and exercise protocol.  We will continue to monitor for side effects.    Please be advised of symptoms to monitor for and to notify me immediately if persistent or worsening.  Follow up with Ophthalmology/Optometry and Podiatry at least annually.    Orders Placed This Encounter   Procedures    Celiac Disease Panel     Standing Status:   Future     Standing Expiration Date:   2/4/2026     Order Specific Question:   Send normal result to authorizing provider's In Basket if patient is active on MyChart:     Answer:   Yes       Medications Ordered This Encounter   Medications    influenza (adjuvanted) (Fluad) 45 mcg/0.5 mL IM vaccine (> or = 66 yo) 0.5 mL      Future Appointments       Date Specialty Appt Notes    11/6/2024 Lab     11/22/2024 Family Medicine            Javed Mcknight MD

## 2024-11-06 NOTE — PATIENT INSTRUCTIONS
Follow up in about 6 months (around 5/6/2025), or if symptoms worsen or fail to improve.     Dear patient,   As a result of recent federal legislation (The Federal Cures Act), you may receive lab or pathology results from your visit in your [facility] account before your physician is able to contact you. Your physician or their representative will relay the results to you with their recommendations at their soonest availability.     If no improvement in symptoms or symptoms worsen, please be advised to call MD, follow-up at clinic and/or go to ER if becomes severe.    Javed Mcknight M.D.        We Offer TELEHEALTH & Same Day Appointments!   Book your Telehealth appointment with me through my nurse or   Clinic appointments on [employer]!    [address] [place] [address]    Office: [phone]   FAX: [phone]    Check out my Facebook Page and Follow Me at: [email or web address]    Check out my website at [employer] by clicking on: [email or web address]    To Schedule appointments online, go to [email or web address]: [email or web address]

## 2024-11-11 LAB
GLIADIN PEPTIDE IGA SER-ACNC: 0.6 U/ML
GLIADIN PEPTIDE IGG SER-ACNC: <0.6 U/ML
IGA SERPL-MCNC: 236 MG/DL (ref 70–400)
TTG IGA SER-ACNC: 0.3 U/ML
TTG IGG SER-ACNC: <0.6 U/ML

## 2024-11-12 NOTE — PROGRESS NOTES
1st check to see if patient has seen the results.  If not then  CALL patient with results and Document verification.  Schedule follow-up if needed.  [phone]    Celiac panel is negative.

## 2024-12-18 DIAGNOSIS — I10 HYPERTENSION, ESSENTIAL: ICD-10-CM

## 2024-12-18 RX ORDER — VALSARTAN 80 MG/1
80 TABLET ORAL DAILY
Qty: 90 TABLET | Refills: 4 | OUTPATIENT
Start: 2024-12-18

## 2024-12-24 DIAGNOSIS — I10 HYPERTENSION, ESSENTIAL: ICD-10-CM

## 2024-12-24 RX ORDER — VALSARTAN 80 MG/1
80 TABLET ORAL DAILY
Qty: 90 TABLET | Refills: 0 | Status: SHIPPED | OUTPATIENT
Start: 2024-12-24

## 2024-12-24 RX ORDER — VALSARTAN 80 MG/1
80 TABLET ORAL DAILY
Qty: 90 TABLET | Refills: 4 | Status: CANCELLED | OUTPATIENT
Start: 2024-12-24

## 2024-12-24 NOTE — TELEPHONE ENCOUNTER
----- Message from Sharon sent at 12/24/2024 10:37 AM CST -----  Type:  RX Refill Request    Who Called: PT    Refill or New Rx:refill    RX Name and Strength: Disp Refills Start End   valsartan (DIOVAN) 80 MG tablet 90 tablet 4 1/3/2023 -   Sig - Route: Take 1 tablet (80 mg total) by mouth once daily. - Oral   Sent to pharmacy as: valsartan (DIOVAN) 80 MG tablet   Notes to Pharmacy: .     How is the patient currently taking it? (ex. 1XDay):see above    Is this a 30 day or 90 day RX:see above    Preferred Pharmacy with phone number:  [facility] [address]  [address] [address]  Phone: [phone] Fax: [phone]      Local or Mail Order:local    Ordering Provider:    Would the patient rather a call back or a response via [facility]? call    Best Call Back Number: [phone]         Additional Information:  pharm states pt nee to be seen to get rx Please call back to advise. Thank you!

## 2025-01-03 DIAGNOSIS — Z95.5 STENTED CORONARY ARTERY: Chronic | ICD-10-CM

## 2025-01-03 DIAGNOSIS — E78.5 DYSLIPIDEMIA: Chronic | ICD-10-CM

## 2025-01-03 DIAGNOSIS — I25.10 CORONARY ARTERY DISEASE INVOLVING NATIVE CORONARY ARTERY OF NATIVE HEART WITHOUT ANGINA PECTORIS: Chronic | ICD-10-CM

## 2025-01-03 RX ORDER — METOPROLOL SUCCINATE 50 MG/1
50 TABLET, EXTENDED RELEASE ORAL DAILY
Qty: 90 TABLET | Refills: 3 | Status: SHIPPED | OUTPATIENT
Start: 2025-01-03

## 2025-01-07 DIAGNOSIS — Z95.5 STENTED CORONARY ARTERY: Chronic | ICD-10-CM

## 2025-01-07 DIAGNOSIS — I25.10 CORONARY ARTERY DISEASE INVOLVING NATIVE CORONARY ARTERY OF NATIVE HEART WITHOUT ANGINA PECTORIS: Chronic | ICD-10-CM

## 2025-01-07 DIAGNOSIS — E78.5 DYSLIPIDEMIA: Chronic | ICD-10-CM

## 2025-01-07 RX ORDER — ATORVASTATIN CALCIUM 40 MG/1
40 TABLET, FILM COATED ORAL DAILY
Qty: 90 TABLET | Refills: 2 | Status: SHIPPED | OUTPATIENT
Start: 2025-01-07

## 2025-02-18 ENCOUNTER — OFFICE VISIT (OUTPATIENT)
Dept: FAMILY MEDICINE | Facility: CLINIC | Age: 65
End: 2025-02-18
Payer: COMMERCIAL

## 2025-02-18 VITALS
OXYGEN SATURATION: 98 % | BODY MASS INDEX: 37.39 KG/M2 | WEIGHT: 246.69 LBS | DIASTOLIC BLOOD PRESSURE: 80 MMHG | SYSTOLIC BLOOD PRESSURE: 126 MMHG | HEIGHT: 68 IN | TEMPERATURE: 98 F | HEART RATE: 80 BPM

## 2025-02-18 DIAGNOSIS — R10.11 RUQ PAIN: Primary | ICD-10-CM

## 2025-02-18 NOTE — PATIENT INSTRUCTIONS
Hydrate well  Rest  Report to ER immediately if symptoms worsen or persist    Aris Healy,     If you are due for any health screening(s) below please notify me so we can arrange them to be ordered and scheduled. Most healthy patients at your age complete them, but you are free to accept or refuse.     If you can't do it, I'll definitely understand. If you can, I'd certainly appreciate it!    Tests to Keep You Healthy    Colon Cancer Screening: DUE  Last Blood Pressure <= 139/89 (11/6/2024): Yes

## 2025-02-18 NOTE — PROGRESS NOTES
Subjective     Patient ID: Niraj Diaz is a 65 y.o. male.    Chief Complaint: Abdominal Pain    Abdominal Pain  This is a new problem. The current episode started in the past 7 days (Since Thursday). The onset quality is undetermined. The problem occurs intermittently. The problem has been waxing and waning. The pain is located in the RUQ. The pain is moderate. The quality of the pain is dull and aching. The abdominal pain does not radiate. Associated symptoms include nausea. Pertinent negatives include no anorexia, arthralgias, belching, constipation, diarrhea, dysuria, fever, flatus, frequency, headaches, hematochezia, hematuria, melena, myalgias, vomiting or weight loss. The pain is aggravated by eating. Relieved by: Tylenol, heating pad. He has tried acetaminophen (Declines medication for pain, nausea) for the symptoms. The treatment provided moderate relief. Prior diagnostic workup includes ultrasound. There is no history of abdominal surgery, colon cancer, Crohn's disease, gallstones, GERD, irritable bowel syndrome, pancreatitis, PUD or ulcerative colitis. Patient's medical history does not include kidney stones and UTI.     Past Medical History:   Diagnosis Date    Bulging lumbar disc     Heart attack     Hypertension     Stroke      Social History[1]  Past Surgical History:   Procedure Laterality Date    VASECTOMY       Past Surgical History:   Procedure Laterality Date    VASECTOMY         Review of Systems   Constitutional: Negative.  Negative for fever and weight loss.   HENT: Negative.     Eyes: Negative.    Respiratory: Negative.     Cardiovascular: Negative.    Gastrointestinal:  Positive for abdominal pain and nausea. Negative for anorexia, constipation, diarrhea, flatus, hematochezia, melena and vomiting.   Endocrine: Negative.    Genitourinary: Negative.  Negative for dysuria, frequency and hematuria.   Musculoskeletal: Negative.  Negative for arthralgias and myalgias.   Integumentary:  Negative.    Allergic/Immunologic: Negative.    Neurological: Negative.  Negative for headaches.   Psychiatric/Behavioral: Negative.            Objective     Physical Exam  Vitals and nursing note reviewed.   Constitutional:       Appearance: Normal appearance.   HENT:      Head: Normocephalic.      Right Ear: Tympanic membrane, ear canal and external ear normal.      Left Ear: Tympanic membrane, ear canal and external ear normal.      Nose: Nose normal.      Mouth/Throat:      Mouth: Mucous membranes are moist.      Pharynx: Oropharynx is clear.   Eyes:      Conjunctiva/sclera: Conjunctivae normal.      Pupils: Pupils are equal, round, and reactive to light.   Cardiovascular:      Rate and Rhythm: Normal rate and regular rhythm.      Pulses: Normal pulses.      Heart sounds: Normal heart sounds.   Pulmonary:      Effort: Pulmonary effort is normal.      Breath sounds: Normal breath sounds.   Abdominal:      General: Bowel sounds are normal.      Palpations: Abdomen is soft.      Tenderness: There is no abdominal tenderness. There is no right CVA tenderness, left CVA tenderness, guarding or rebound. Negative signs include Cao's sign, Rovsing's sign, McBurney's sign, psoas sign and obturator sign.   Musculoskeletal:         General: Normal range of motion.      Cervical back: Normal range of motion and neck supple.   Skin:     General: Skin is warm and dry.      Capillary Refill: Capillary refill takes 2 to 3 seconds.   Neurological:      Mental Status: He is alert and oriented to person, place, and time.   Psychiatric:         Mood and Affect: Mood normal.         Behavior: Behavior normal.         Thought Content: Thought content normal.         Judgment: Judgment normal.            Assessment and Plan     1. RUQ pain  -     Hepatic Function Panel; Future; Expected date: 02/18/2025  -     Amylase; Future; Expected date: 02/18/2025  -     Lipase; Future; Expected date: 02/18/2025  -     CBC Auto Differential; Future;  Expected date: 02/18/2025  -     US Abdomen Limited; Future; Expected date: 02/18/2025  Hydrate well  Rest  Report to ER immediately if symptoms worsen or persist               No follow-ups on file.         [1]   Social History  Socioeconomic History    Marital status:    Tobacco Use    Smoking status: Never    Smokeless tobacco: Never    Tobacco comments:     2pack a day 40 years ago   Substance and Sexual Activity    Alcohol use: No    Drug use: No    Sexual activity: Yes     Partners: Female     Birth control/protection: Post-menopausal, None   Social History Narrative    [employer] [employer] - M x 31, lives in [place], 4 kids     Social Drivers of Health     Financial Resource Strain: Low Risk  (11/5/2024)    Overall Financial Resource Strain (CARDIA)     Difficulty of Paying Living Expenses: Not hard at all   Food Insecurity: No Food Insecurity (11/5/2024)    Hunger Vital Sign     Worried About Running Out of Food in the Last Year: Never true     Ran Out of Food in the Last Year: Never true   Physical Activity: Insufficiently Active (11/5/2024)    Exercise Vital Sign     Days of Exercise per Week: 2 days     Minutes of Exercise per Session: 10 min   Stress: No Stress Concern Present (11/5/2024)    [employer] - Occupational Stress Questionnaire     Feeling of Stress : Only a little   Housing Stability: Unknown (11/5/2024)    Housing Stability Vital Sign     Unable to Pay for Housing in the Last Year: No

## 2025-02-21 ENCOUNTER — RESULTS FOLLOW-UP (OUTPATIENT)
Dept: FAMILY MEDICINE | Facility: CLINIC | Age: 65
End: 2025-02-21
Payer: COMMERCIAL

## 2025-02-21 ENCOUNTER — HOSPITAL ENCOUNTER (OUTPATIENT)
Dept: RADIOLOGY | Facility: HOSPITAL | Age: 65
Discharge: HOME | End: 2025-02-21
Attending: NURSE PRACTITIONER
Payer: COMMERCIAL

## 2025-02-21 DIAGNOSIS — R10.11 RUQ PAIN: ICD-10-CM

## 2025-02-21 DIAGNOSIS — R93.5 ABNORMAL US (ULTRASOUND) OF ABDOMEN: Primary | ICD-10-CM

## 2025-02-21 PROCEDURE — 99999 PR PBB SHADOW E&M-EST. PATIENT-LVL I: CPT | Mod: PBBFAC,,, | Performed by: NURSE PRACTITIONER

## 2025-02-21 PROCEDURE — 76705 ECHO EXAM OF ABDOMEN: CPT | Mod: TC,PO

## 2025-02-21 PROCEDURE — 76705 ECHO EXAM OF ABDOMEN: CPT | Mod: 26,,, | Performed by: RADIOLOGY

## 2025-02-24 NOTE — TELEPHONE ENCOUNTER
----- Message from Sharon Cunningham DNP sent at 2/21/2025  2:15 PM CST -----  Reviewed; gallbladder sludge noted to gallbladder; due to symptoms, recommend general surgery for further evaluation. Mild liver enlargement, fatty liver noted (noted on US in 2013); monitor liver   function and size q6-12m; additional information below. Large simple cyst noted to right kidney; monitor; recheck via US in 3m for comparison.     Patient Education        Nonalcoholic Fatty Liver Disease   The Basics   Written by the doctors and editors at [facility]   What is nonalcoholic fatty liver disease? -- Nonalcoholic fatty liver disease, sometimes called NAFLD, is a condition in which fat builds up in the liver. The liver is a big organ in the upper right   side of the belly (figure 1).  There are two types of NAFLD:  Nonalcoholic fatty liver (also called NAFL) - In NAFL, the liver has fat buildup, but is not inflamed  Nonalcoholic steatohepatitis (also called RIVERA) - In RIVERA, the liver has fat buildup and is inflamed  This article is mostly about RIVERA, because that is the condition that can lead to the most problems.  People who drink too much alcohol can get a condition similar to RIVERA. But RIVERA is not related to drinking too much alcohol.  What causes RIVERA? -- Doctors do not know what causes RIVERA. They do know that RIVERA happens more often in some people, such as those who:  Are overweight  Have diabetes, which causes blood sugar levels to get too high  Have high cholesterol  Take certain medicines  What are the symptoms of RIVERA? -- Most people with RIVERA have no symptoms.  Your doctor or nurse might suspect that you have RIVERA from the results of your routine blood tests.  Will I need more tests? -- Yes. If your doctor or nurse suspects that you have RIVERA, you will likely have:  More blood tests  An imaging test of the liver, such as an ultrasound, CT, or MRI scan - Imaging tests create pictures of the inside of the body.  Some  people need a liver biopsy. During this test, a doctor removes a small sample of tissue from the liver. Then another doctor looks at the sample under a microscope to see if RIVERA is present. A   liver biopsy is the only test that can tell for sure if you have RIVERA. Your doctor might do this test if they are not sure if you have RIVERA or to see how much inflammation there is in the liver. If   your blood tests and imaging tests are normal you will not need a liver biopsy.  How is RIVERA treated? -- RIVERA is not typically treated directly. But the condition can get better when other medical conditions that often happen with RIVERA get treated. For example, losing weight, and   controlling high blood sugar and cholesterol can help improve RIVERA.  With that in mind, your doctor can:  Help you lose weight, if you are overweight - If your doctor recommends losing weight, they can help you make a plan to do this safely. It's important not to lose weight too quickly. Do not lose more   than 3.5 pounds (1.6 kilograms) a week.  Treat your high blood sugar, if you have high blood sugar  Treat your high cholesterol, if you have high cholesterol  Making these changes has benefits besides helping with RIVERA. These changes can also reduce your chances of having a heart attack or stroke. That's important because people with RIVERA are often also at   risk for heart disease and stroke.  If you take a medicine that could be causing RIVERA, your doctor will stop or change that medicine.  If you have a severe form of RIVERA but do not also have diabetes or heart disease, your doctor or nurse might suggest that you take vitamin E. A few studies suggest that vitamin E can reduce some of   the liver damage that occurs as part of RIVERA. On the other hand, there are also studies that suggest that high doses of vitamin E increase the risk of death. So do not take vitamin E unless your   doctor or nurse recommends it.  Does RIVERA get worse over time? -- RIVERA  "might get worse over time. Sometimes it leads to serious scarring of the liver, called "cirrhosis." Cirrhosis can cause different symptoms, such as swelling in   the legs, trouble breathing, or feeling tired. If you get cirrhosis, your doctor will talk with you about different possible treatments.  Do I need to follow up with my doctor? -- Yes. People who have RIVERA need to see their doctor for regular check-ups. Your doctor will do follow-up tests on a regular basis. These usually include blood   tests.  All topics are updated as new evidence becomes available and our peer review process is complete.  This topic retrieved from [employer] on: Sep 21, 2021.  Topic 91231 Version 9.0  Release: 29.4.2 - C29.263  © 2021 UpToDate, Inc. and/or its affiliates. All rights reserved.  figure 1: Organs inside the abdomen (belly)     Graphic 03364 Version 6.0     Consumer Information Use and Disclaimer   This information is not specific medical advice and does not replace information you receive from your health care provider. This is only a brief summary of general information. It does NOT include   all information about conditions, illnesses, injuries, tests, procedures, treatments, therapies, discharge instructions or life-style choices that may apply to you. You must talk with your health   care provider for complete information about your health and treatment options. This information should not be used to decide whether or not to accept your health care provider's advice, instructions   or recommendations. Only your health care provider has the knowledge and training to provide advice that is right for you. The use of this information is governed by the [employer] End User License   Agreement, available at https://[email or web address]/en/solutions/MPGomatic.com/about/alisha.The use of [employer] content is governed by the [employer] Terms of Use. ©2021 UpToDate, Inc. All rights reserved.  Copyright   © 2021 UpToDate, Inc. and/or its " affiliates. All rights reserved.    ----- Message -----  From: Interface, Rad Results In  Sent: 2/21/2025   9:32 AM CST  To: Sharon Cunningham DNP

## 2025-02-24 NOTE — TELEPHONE ENCOUNTER
----- Message from Yamile Nazario LPN sent at 2/24/2025  9:44 AM CST -----      ----- Message -----  From: Sharon Cunningham DNP  Sent: 2/21/2025   2:15 PM CST  To: Marisa Herrera Staff    Reviewed; gallbladder sludge noted to gallbladder; due to symptoms, recommend general surgery for further evaluation. Mild liver enlargement, fatty liver noted (noted on US in 2013); monitor liver   function and size q6-12m; additional information below. Large simple cyst noted to right kidney; monitor; recheck via US in 3m for comparison.     Patient Education        Nonalcoholic Fatty Liver Disease   The Basics   Written by the doctors and editors at [facility]   What is nonalcoholic fatty liver disease? -- Nonalcoholic fatty liver disease, sometimes called NAFLD, is a condition in which fat builds up in the liver. The liver is a big organ in the upper right   side of the belly (figure 1).  There are two types of NAFLD:  Nonalcoholic fatty liver (also called NAFL) - In NAFL, the liver has fat buildup, but is not inflamed  Nonalcoholic steatohepatitis (also called RIVERA) - In RIVERA, the liver has fat buildup and is inflamed  This article is mostly about RIVERA, because that is the condition that can lead to the most problems.  People who drink too much alcohol can get a condition similar to RIVERA. But RIVERA is not related to drinking too much alcohol.  What causes RIVERA? -- Doctors do not know what causes RIVERA. They do know that RIVERA happens more often in some people, such as those who:  Are overweight  Have diabetes, which causes blood sugar levels to get too high  Have high cholesterol  Take certain medicines  What are the symptoms of RIVERA? -- Most people with RIVERA have no symptoms.  Your doctor or nurse might suspect that you have RIVERA from the results of your routine blood tests.  Will I need more tests? -- Yes. If your doctor or nurse suspects that you have RIVERA, you will likely have:  More blood tests  An imaging test of  the liver, such as an ultrasound, CT, or MRI scan - Imaging tests create pictures of the inside of the body.  Some people need a liver biopsy. During this test, a doctor removes a small sample of tissue from the liver. Then another doctor looks at the sample under a microscope to see if RIVERA is present. A   liver biopsy is the only test that can tell for sure if you have RIVERA. Your doctor might do this test if they are not sure if you have RIVERA or to see how much inflammation there is in the liver. If   your blood tests and imaging tests are normal you will not need a liver biopsy.  How is RIVERA treated? -- RIVERA is not typically treated directly. But the condition can get better when other medical conditions that often happen with RIVERA get treated. For example, losing weight, and   controlling high blood sugar and cholesterol can help improve RIVERA.  With that in mind, your doctor can:  Help you lose weight, if you are overweight - If your doctor recommends losing weight, they can help you make a plan to do this safely. It's important not to lose weight too quickly. Do not lose more   than 3.5 pounds (1.6 kilograms) a week.  Treat your high blood sugar, if you have high blood sugar  Treat your high cholesterol, if you have high cholesterol  Making these changes has benefits besides helping with RIVERA. These changes can also reduce your chances of having a heart attack or stroke. That's important because people with RIVERA are often also at   risk for heart disease and stroke.  If you take a medicine that could be causing RIVERA, your doctor will stop or change that medicine.  If you have a severe form of RIVERA but do not also have diabetes or heart disease, your doctor or nurse might suggest that you take vitamin E. A few studies suggest that vitamin E can reduce some of   the liver damage that occurs as part of RIVERA. On the other hand, there are also studies that suggest that high doses of vitamin E increase the risk of  "death. So do not take vitamin E unless your   doctor or nurse recommends it.  Does RIVERA get worse over time? -- RIVERA might get worse over time. Sometimes it leads to serious scarring of the liver, called "cirrhosis." Cirrhosis can cause different symptoms, such as swelling in   the legs, trouble breathing, or feeling tired. If you get cirrhosis, your doctor will talk with you about different possible treatments.  Do I need to follow up with my doctor? -- Yes. People who have RIVERA need to see their doctor for regular check-ups. Your doctor will do follow-up tests on a regular basis. These usually include blood   tests.  All topics are updated as new evidence becomes available and our peer review process is complete.  This topic retrieved from [employer] on: Sep 21, 2021.  Topic 34678 Version 9.0  Release: 29.4.2 - C29.263  © 2021 UpToDate, Inc. and/or its affiliates. All rights reserved.  figure 1: Organs inside the abdomen (belly)     Graphic 16884 Version 6.0     Consumer Information Use and Disclaimer   This information is not specific medical advice and does not replace information you receive from your health care provider. This is only a brief summary of general information. It does NOT include   all information about conditions, illnesses, injuries, tests, procedures, treatments, therapies, discharge instructions or life-style choices that may apply to you. You must talk with your health   care provider for complete information about your health and treatment options. This information should not be used to decide whether or not to accept your health care provider's advice, instructions   or recommendations. Only your health care provider has the knowledge and training to provide advice that is right for you. The use of this information is governed by the [employer] End User License   Agreement, available at https://www.Soceaniq.Keaton Row/en/solutions/"[employer]/about/alisha.The use of [employer] content is governed by " the [employer] Terms of Use. ©2021 [employer] All rights reserved.  Copyright   © 2021 UpToDate, Inc. and/or its affiliates. All rights reserved.    ----- Message -----  From: Interface, Rad Results In  Sent: 2/21/2025   9:32 AM CST  To: Sharon Cunningham DNP

## 2025-02-26 ENCOUNTER — OFFICE VISIT (OUTPATIENT)
Dept: CARDIOLOGY | Facility: CLINIC | Age: 65
End: 2025-02-26
Payer: COMMERCIAL

## 2025-02-26 VITALS
SYSTOLIC BLOOD PRESSURE: 122 MMHG | DIASTOLIC BLOOD PRESSURE: 78 MMHG | HEIGHT: 68 IN | HEART RATE: 70 BPM | BODY MASS INDEX: 37.49 KG/M2 | WEIGHT: 247.38 LBS

## 2025-02-26 DIAGNOSIS — G47.33 OSA (OBSTRUCTIVE SLEEP APNEA): ICD-10-CM

## 2025-02-26 DIAGNOSIS — E66.2 CLASS 2 OBESITY WITH ALVEOLAR HYPOVENTILATION, SERIOUS COMORBIDITY, AND BODY MASS INDEX (BMI) OF 36.0 TO 36.9 IN ADULT: Chronic | ICD-10-CM

## 2025-02-26 DIAGNOSIS — Z95.5 HISTORY OF CORONARY ARTERY STENT PLACEMENT: ICD-10-CM

## 2025-02-26 DIAGNOSIS — E78.5 DYSLIPIDEMIA: Chronic | ICD-10-CM

## 2025-02-26 DIAGNOSIS — R91.1 NODULE OF UPPER LOBE OF RIGHT LUNG: ICD-10-CM

## 2025-02-26 DIAGNOSIS — E66.812 CLASS 2 OBESITY WITH ALVEOLAR HYPOVENTILATION, SERIOUS COMORBIDITY, AND BODY MASS INDEX (BMI) OF 36.0 TO 36.9 IN ADULT: Chronic | ICD-10-CM

## 2025-02-26 DIAGNOSIS — I10 HYPERTENSION, ESSENTIAL: ICD-10-CM

## 2025-02-26 DIAGNOSIS — I25.10 CORONARY ARTERY DISEASE INVOLVING NATIVE CORONARY ARTERY OF NATIVE HEART WITHOUT ANGINA PECTORIS: Primary | Chronic | ICD-10-CM

## 2025-02-26 PROCEDURE — 99999 PR PBB SHADOW E&M-EST. PATIENT-LVL III: CPT | Mod: PBBFAC,,, | Performed by: INTERNAL MEDICINE

## 2025-02-26 NOTE — PROGRESS NOTES
Subjective:    Patient ID:  Niraj Diaz is a 65 y.o. male patient here for evaluation Follow-up      History of Present Illness:  Cardiology follow-up.  Coronary artery disease.  History of PCI stent remote past, [facility].  No records dyslipidemia, hypertension, risk factors controlled.  Patient asymptomatic.  Denies angina dyspnea arrhythmia.  To go up and down flights of stairs without exertional complaints.  Patient remains on Plavix, statin agent and Ace receptor blocker for blood pressure control    Recent issues with probable gallbladder disease may need future surgery.  Diet management at present.             Review of patient's allergies indicates:   Allergen Reactions    Gluten protein        Past Medical History:   Diagnosis Date    Bulging lumbar disc     Heart attack     Hypertension     Stroke      Past Surgical History:   Procedure Laterality Date    VASECTOMY       Social History[1]     Review of Systems:    As noted in HPI in addition      REVIEW OF SYSTEMS  Review of Systems   Constitutional: Negative for decreased appetite, diaphoresis, night sweats, weight gain and weight loss.   HENT:  Negative for nosebleeds and odynophagia.    Eyes:  Negative for double vision and photophobia.   Cardiovascular:  Negative for chest pain, claudication, cyanosis, dyspnea on exertion, irregular heartbeat, leg swelling, near-syncope, orthopnea, palpitations, paroxysmal nocturnal dyspnea and syncope.   Respiratory:  Negative for cough, hemoptysis, shortness of breath and wheezing.    Hematologic/Lymphatic: Negative for adenopathy.   Skin:  Negative for flushing, skin cancer and suspicious lesions.   Musculoskeletal:  Negative for gout, myalgias and neck pain.   Gastrointestinal:  Negative for abdominal pain, heartburn, hematemesis and hematochezia.   Genitourinary:  Negative for bladder incontinence, hesitancy and nocturia.   Neurological:  Negative for focal weakness, headaches, light-headedness and  paresthesias.   Psychiatric/Behavioral:  Negative for memory loss and substance abuse.               Objective:        Vitals:    02/26/25 1057   BP: 122/78   Pulse: 70       Lab Results   Component Value Date    WBC 7.12 02/21/2025    HGB 15.7 02/21/2025    HCT 49.3 02/21/2025     02/21/2025    CHOL 121 11/05/2024    TRIG 120 11/05/2024    HDL 43 11/05/2024    ALT 46 (H) 02/21/2025    AST 38 02/21/2025     11/05/2024    K 5.0 11/05/2024     11/05/2024    CREATININE 0.9 11/05/2024    BUN 8 11/05/2024    CO2 25 11/05/2024    TSH 1.530 11/05/2024    PSA 0.57 11/05/2024    HGBA1C 5.4 11/05/2024        ECHOCARDIOGRAM RESULTS  No results found for this or any previous visit.    No results found for this or any previous visit.          CURRENT/PREVIOUS VISIT EKG  Results for orders placed or performed during the hospital encounter of 07/05/20   EKG 12-lead    Collection Time: 07/05/20 11:18 AM    Narrative    Test Reason : R06.02,    Vent. Rate : 098 BPM     Atrial Rate : 098 BPM     P-R Int : 178 ms          QRS Dur : 096 ms      QT Int : 354 ms       P-R-T Axes : 022 070 018 degrees     QTc Int : 451 ms    Normal sinus rhythm  Incomplete right bundle branch block  Borderline Abnormal ECG  When compared with ECG of 13-OCT-2017 09:41,  Previous ECG has undetermined rhythm, needs review  QT has lengthened  Confirmed by Nikos NEFF, Colin WOLFF ([id number]) on 7/6/2020 1:57:25 PM    Referred By: NELL   SELF           Confirmed By:Colin Knott MD     No valid procedures specified.   No results found for this or any previous visit.    No valid procedures specified.    PHYSICAL EXAM  GENERAL: well built, well nourished, well-developed in no apparent distress alert and oriented.   HEENT: Normocephalic. Pupils normal and conjunctivae normal.  Mucous membranes normal, no cyanosis or icterus, trachea central,no pallor or icterus is noted..   NECK: No JVD. No bruit..   THYROID: Thyroid not enlarged. No nodules  present..   CARDIAC:  Normal S1-S2.  No murmur rub click or gallop.  PMI nondisplaced.     LUNGS: Clear to auscultation. No wheezing or rhonchi..   ABDOMEN: Soft no masses or organomegaly.  No abdomen pulsations or bruits.  Normal bowel sounds. No pulsations and no masses felt, No guarding or rebound.   URINARY: No yuan catheter   EXTREMITIES: No cyanosis, clubbing or edema noted at this time., no calf tenderness bilaterally.   PERIPHERAL VASCULAR SYSTEM: Good palpable distal pulses.  2+ femoral, popliteal and pedal pulses.  No bruits    CENTRAL NERVOUS SYSTEM: No focal motor or sensory deficits noted.   SKIN: Skin without lesions, moist, well perfused.   MUSCLE STRENGTH & TONE: No noteable weakness, atrophy or abnormal movement    I HAVE REVIEWED :    The vital signs, nurses notes, and all the pertinent radiology and labs.         Current Outpatient Medications   Medication Instructions    atorvastatin (LIPITOR) 40 mg, Oral, Daily    clopidogreL (PLAVIX) 75 mg, Oral, Daily    glucosamine HCl 1,500 mg Tab     metoprolol succinate (TOPROL-XL) 50 mg, Oral, Daily    multivit with minerals/lutein (MULTIVITAMIN 50 PLUS ORAL)     nitroGLYCERIN (NITROSTAT) 0.4 mg, Sublingual, Every 5 min PRN    valsartan (DIOVAN) 80 mg, Oral, Daily          Assessment:     Assessment:   Coronary disease.  Remote PCI stent [facility], no records.  Negative noninvasive cardiac assessment 2017.  Echo , nuclear study.  Hypertension, dyslipidemia  TIA, following past by neurology.  Non recurrent.  No angina, no significant dyspnea.           Plan:   Cardiac exam review systems stable.  Recommend no change current medications.  See me again about six-month.          No follow-ups on file.            [1]   Social History  Tobacco Use    Smoking status: Never    Smokeless tobacco: Never    Tobacco comments:     2pack a day 40 years ago   Substance Use Topics    Alcohol use: No    Drug use: No

## 2025-03-16 DIAGNOSIS — I10 HYPERTENSION, ESSENTIAL: ICD-10-CM

## 2025-03-17 RX ORDER — VALSARTAN 80 MG/1
80 TABLET ORAL DAILY
Qty: 90 TABLET | Refills: 0 | Status: SHIPPED | OUTPATIENT
Start: 2025-03-17

## 2025-04-20 ENCOUNTER — PATIENT MESSAGE (OUTPATIENT)
Dept: FAMILY MEDICINE | Facility: CLINIC | Age: 65
End: 2025-04-20
Payer: COMMERCIAL

## 2025-04-20 DIAGNOSIS — R10.11 RUQ PAIN: Primary | ICD-10-CM

## 2025-04-20 DIAGNOSIS — R93.5 ABNORMAL US (ULTRASOUND) OF ABDOMEN: ICD-10-CM

## 2025-04-21 RX ORDER — TRAMADOL HYDROCHLORIDE 50 MG/1
50 TABLET ORAL EVERY 6 HOURS PRN
Qty: 10 TABLET | Refills: 0 | Status: SHIPPED | OUTPATIENT
Start: 2025-04-21

## 2025-04-21 NOTE — TELEPHONE ENCOUNTER
Referral placed and also sent in a few pain meds as well.    I have signed for the following orders AND/OR meds.  Please call the patient and ask the patient to schedule the testing AND/OR inform about any medications that were sent. Medications have been sent to pharmacy listed below      Orders Placed This Encounter   Procedures    Ambulatory referral/consult to General Surgery     Standing Status:   Future     Expected Date:   4/28/2025     Expiration Date:   5/21/2026     Referral Priority:   Routine     Referral Type:   Consultation     Referral Reason:   Specialty Services Required     Referred to Provider:   ROBERT Rachel MD     Requested Specialty:   General Surgery     Number of Visits Requested:   1       Medications Ordered This Encounter   Medications    traMADoL (ULTRAM) 50 mg tablet     Sig: Take 1 tablet (50 mg total) by mouth every 6 (six) hours as needed for Pain.     Dispense:  10 tablet     Refill:  0     Quantity prescribed more than 7 day supply? No     I have reviewed the Prescription Drug Monitoring Program (PDMP) database for this patient prior to prescribing the above opioid medication:   Yes         [facility]  [address]  [place] [address]  Phone: [phone] Fax: [phone]    [facility]  [address] [facility] [address] [place] [address]  Phone: [phone] Fax: [phone]

## 2025-04-21 NOTE — TELEPHONE ENCOUNTER
Amb ref to    [Slightly Antalgic] : slightly antalgic [Normal] : Oriented to person, place, and time, insight and judgement were intact and the affect was normal [de-identified] : .knee\par \par Knee exam\par Constitutional: Well-nourished, well-developed, No acute distress\par Respiratory:  Good respiratory effort, no SOB\par Lymphatic: No regional lymphadenopathy, no lymphedema\par Psychiatric: Pleasant and normal affect, alert and oriented x3\par Skin: Clean dry and intact B/L UE/LE\par Musculoskeletal: normal except where as noted in regional exam\par \par \par Left Knee:\par APPEARANCE: no marked deformities, no swelling or malalignment\par POSITIVE TENDERNESS:  lateral and medial joint line\par NONTENDER: retinacula b/l, patellar & quadriceps tendons, MCL/LCL, ITB at the lateral femoral condyle & Gerdy's tubercle, pes bursa. \par ROM: full range with pain at end of flexion. \par RESISTIVE TESTING: painless resisted knee flex/ext. \par SPECIAL TESTS: stable v/v stress. painless grind. neg Lachman's. neg ant/post drawer. pain with Vin's. neg Thessaly test. neg Rafat's & Malacrae's\par NEURO: Normal sensation of LE, DTRs 2+/4 patella and achilles\par PULSES: 2+ DP/PT pulses\par B/L Hips: No asymmetry, malalignment, or swelling, Full ROM, 5/5 strength in flexion/ext, IR/ER, Abd/Add, Joints stable\par B/L Ankles: No asymmetry, malalignment, or swelling, Full ROM, 5/5 strength in DF/PF/Inv/Ev, Joints stable\par BIOMECHANICAL EXAM: no marked leg length discrepancy, [default value]hip abductor weakness b/l, no marked foot pronation, tight hams and ITB b/l.  \par \par \par \par  [de-identified] : The following radiographs were ordered and read by me during this patient's visit. I reviewed each radiograph in detail with the patient and discussed the findings as highlighted below. \par \par 4 views of the left knee were obtained today that show lateral patellar tilt. Otherwise unremarkable.

## 2025-04-22 NOTE — TELEPHONE ENCOUNTER
Orders Placed This Encounter   Procedures    Ambulatory referral/consult to General Surgery     Standing Status:   Future     Expected Date:   4/28/2025     Expiration Date:   5/21/2026     Referral Priority:   Routine     Referral Type:   Consultation     Referral Reason:   Specialty Services Required     Referred to Provider:   ROBERT Rachel MD     Requested Specialty:   General Surgery     Number of Visits Requested:   1    Ambulatory referral/consult to General Surgery     Standing Status:   Future     Expected Date:   4/29/2025     Expiration Date:   5/22/2026     Referral Priority:   Routine     Referral Type:   Surgical     Referral Reason:   Specialty Services Required     Requested Specialty:   General Surgery     Number of Visits Requested:   1       Medications Ordered This Encounter   Medications    traMADoL (ULTRAM) 50 mg tablet     Sig: Take 1 tablet (50 mg total) by mouth every 6 (six) hours as needed for Pain.     Dispense:  10 tablet     Refill:  0     Quantity prescribed more than 7 day supply? No     I have reviewed the Prescription Drug Monitoring Program (PDMP) database for this patient prior to prescribing the above opioid medication:   Yes

## 2025-05-20 ENCOUNTER — PATIENT MESSAGE (OUTPATIENT)
Dept: SURGERY | Facility: CLINIC | Age: 65
End: 2025-05-20

## 2025-05-20 ENCOUNTER — OFFICE VISIT (OUTPATIENT)
Dept: SURGERY | Facility: CLINIC | Age: 65
End: 2025-05-20
Payer: COMMERCIAL

## 2025-05-20 VITALS
DIASTOLIC BLOOD PRESSURE: 83 MMHG | HEART RATE: 82 BPM | WEIGHT: 237.88 LBS | HEIGHT: 68 IN | TEMPERATURE: 98 F | SYSTOLIC BLOOD PRESSURE: 144 MMHG | BODY MASS INDEX: 36.05 KG/M2

## 2025-05-20 DIAGNOSIS — R93.5 ABNORMAL US (ULTRASOUND) OF ABDOMEN: ICD-10-CM

## 2025-05-20 DIAGNOSIS — R10.11 RUQ PAIN: ICD-10-CM

## 2025-05-20 PROCEDURE — 4010F ACE/ARB THERAPY RXD/TAKEN: CPT | Mod: CPTII,S$GLB,, | Performed by: STUDENT IN AN ORGANIZED HEALTH CARE EDUCATION/TRAINING PROGRAM

## 2025-05-20 PROCEDURE — 3288F FALL RISK ASSESSMENT DOCD: CPT | Mod: CPTII,S$GLB,, | Performed by: STUDENT IN AN ORGANIZED HEALTH CARE EDUCATION/TRAINING PROGRAM

## 2025-05-20 PROCEDURE — 1159F MED LIST DOCD IN RCRD: CPT | Mod: CPTII,S$GLB,, | Performed by: STUDENT IN AN ORGANIZED HEALTH CARE EDUCATION/TRAINING PROGRAM

## 2025-05-20 PROCEDURE — 1125F AMNT PAIN NOTED PAIN PRSNT: CPT | Mod: CPTII,S$GLB,, | Performed by: STUDENT IN AN ORGANIZED HEALTH CARE EDUCATION/TRAINING PROGRAM

## 2025-05-20 PROCEDURE — 1101F PT FALLS ASSESS-DOCD LE1/YR: CPT | Mod: CPTII,S$GLB,, | Performed by: STUDENT IN AN ORGANIZED HEALTH CARE EDUCATION/TRAINING PROGRAM

## 2025-05-20 PROCEDURE — 99204 OFFICE O/P NEW MOD 45 MIN: CPT | Mod: S$GLB,,, | Performed by: STUDENT IN AN ORGANIZED HEALTH CARE EDUCATION/TRAINING PROGRAM

## 2025-05-20 PROCEDURE — 3008F BODY MASS INDEX DOCD: CPT | Mod: CPTII,S$GLB,, | Performed by: STUDENT IN AN ORGANIZED HEALTH CARE EDUCATION/TRAINING PROGRAM

## 2025-05-20 PROCEDURE — 3077F SYST BP >= 140 MM HG: CPT | Mod: CPTII,S$GLB,, | Performed by: STUDENT IN AN ORGANIZED HEALTH CARE EDUCATION/TRAINING PROGRAM

## 2025-05-20 PROCEDURE — 3079F DIAST BP 80-89 MM HG: CPT | Mod: CPTII,S$GLB,, | Performed by: STUDENT IN AN ORGANIZED HEALTH CARE EDUCATION/TRAINING PROGRAM

## 2025-05-20 PROCEDURE — 99999 PR PBB SHADOW E&M-EST. PATIENT-LVL IV: CPT | Mod: PBBFAC,,, | Performed by: STUDENT IN AN ORGANIZED HEALTH CARE EDUCATION/TRAINING PROGRAM

## 2025-05-20 NOTE — PROGRESS NOTES
"History & Physical    Subjective     History of Present Illness:  Patient is a 65 y.o. male presents with right upper quadrant abdominal pain.  This is worse usually after meals and usually after fatty meals in particular.  No prior abdominal surgery.  He was referred to me after an ultrasound showed sludge in the gallbladder.    Chief Complaint   Patient presents with    Consult     Gallbladder       Review of patient's allergies indicates:   Allergen Reactions    Gluten protein        Current Medications[1]    Past Medical History:   Diagnosis Date    Bulging lumbar disc     Heart attack     Hypertension     Stroke      Past Surgical History:   Procedure Laterality Date    VASECTOMY       Family History   Problem Relation Name Age of Onset    Hypertension Mother      Osteoarthritis Mother      Heart disease Mother      Prostate cancer Father      Hypertension Maternal Grandfather      Heart disease Maternal Grandfather       Social History[2]     Review of Systems:  Review of Systems   Constitutional: Negative.  Negative for fatigue and fever.   HENT: Negative.     Eyes: Negative.    Respiratory: Negative.  Negative for shortness of breath.    Cardiovascular: Negative.  Negative for chest pain.   Gastrointestinal:  Positive for abdominal pain.   Endocrine: Negative.    Genitourinary: Negative.    Musculoskeletal: Negative.    Skin: Negative.    Allergic/Immunologic: Negative.    Neurological: Negative.    Hematological: Negative.    Psychiatric/Behavioral: Negative.            Objective     Vital Signs (Most Recent)  Temp: 97.5 °F (36.4 °C) (05/20/25 0838)  Pulse: 82 (05/20/25 0838)  BP: (!) 144/83 (05/20/25 0838)  5' 8" (1.727 m)  107.9 kg (237 lb 14 oz)     Physical Exam:  Physical Exam  Constitutional:       General: He is not in acute distress.     Appearance: Normal appearance. He is not ill-appearing, toxic-appearing or diaphoretic.   HENT:      Head: Normocephalic.      Nose: Nose normal.   Eyes:      " Conjunctiva/sclera: Conjunctivae normal.   Cardiovascular:      Rate and Rhythm: Normal rate and regular rhythm.   Pulmonary:      Effort: Pulmonary effort is normal.   Abdominal:      Palpations: Abdomen is soft.   Musculoskeletal:         General: Normal range of motion.      Cervical back: Normal range of motion.   Skin:     General: Skin is warm.   Neurological:      General: No focal deficit present.      Mental Status: He is alert.   Psychiatric:         Mood and Affect: Mood normal.         Laboratory  CBC, lipase, CMP were all normal    Diagnostic Results:  Ultrasound shows sludge in the gallbladder without secondary signs of cholecystitis       Assessment and Plan   65-year-old male with gall stones/sludge.    PLAN:  Risks versus benefits of observation versus proceeding with cholecystectomy were discussed.  Given BMI, would recommend proceeding with robotic intervention as this is the safest route for surgery.  We did discuss that surgery may fail to alleviate his current symptoms.  Additional tests including HIDA scan and or CT imaging were discussed.  Patient is leaning towards operative intervention and will likely call to set up a surgery date in the near future.  Consent was obtained for the planned procedure.              [1]   Current Outpatient Medications   Medication Sig Dispense Refill    atorvastatin (LIPITOR) 40 MG tablet Take 1 tablet (40 mg total) by mouth once daily. 90 tablet 2    clopidogreL (PLAVIX) 75 mg tablet Take 1 tablet (75 mg total) by mouth once daily. 90 tablet 4    glucosamine HCl 1,500 mg Tab       metoprolol succinate (TOPROL-XL) 50 MG 24 hr tablet Take 1 tablet (50 mg total) by mouth once daily. 90 tablet 3    multivit with minerals/lutein (MULTIVITAMIN 50 PLUS ORAL)       nitroGLYCERIN (NITROSTAT) 0.4 MG SL tablet Place 1 tablet (0.4 mg total) under the tongue every 5 (five) minutes as needed for Chest pain. 25 tablet 4    traMADoL (ULTRAM) 50 mg tablet Take 1 tablet (50 mg  total) by mouth every 6 (six) hours as needed for Pain. 10 tablet 0    valsartan (DIOVAN) 80 MG tablet Take 1 tablet (80 mg total) by mouth once daily. 90 tablet 0     No current facility-administered medications for this visit.   [2]   Social History  Tobacco Use    Smoking status: Never    Smokeless tobacco: Never    Tobacco comments:     2pack a day 40 years ago   Substance Use Topics    Alcohol use: No    Drug use: No

## 2025-05-21 ENCOUNTER — PATIENT MESSAGE (OUTPATIENT)
Dept: CARDIOLOGY | Facility: CLINIC | Age: 65
End: 2025-05-21
Payer: COMMERCIAL

## 2025-05-21 RX ORDER — CEFAZOLIN SODIUM 2 G/50ML
2 SOLUTION INTRAVENOUS
OUTPATIENT
Start: 2025-05-21

## 2025-05-21 RX ORDER — SODIUM CHLORIDE 9 MG/ML
INJECTION, SOLUTION INTRAVENOUS CONTINUOUS
OUTPATIENT
Start: 2025-05-21

## 2025-06-12 ENCOUNTER — PATIENT MESSAGE (OUTPATIENT)
Dept: SURGERY | Facility: CLINIC | Age: 65
End: 2025-06-12
Payer: COMMERCIAL

## 2025-06-12 DIAGNOSIS — R93.5 ABNORMAL ULTRASOUND OF ABDOMEN: ICD-10-CM

## 2025-06-12 DIAGNOSIS — R10.11 RUQ ABDOMINAL PAIN: Primary | ICD-10-CM

## 2025-06-13 DIAGNOSIS — R10.11 RUQ ABDOMINAL PAIN: Primary | ICD-10-CM

## 2025-06-13 DIAGNOSIS — R93.5 ABNORMAL ULTRASOUND OF ABDOMEN: ICD-10-CM

## 2025-06-13 DIAGNOSIS — Z01.818 PRE-OP TESTING: ICD-10-CM

## 2025-06-14 DIAGNOSIS — I10 HYPERTENSION, ESSENTIAL: ICD-10-CM

## 2025-06-16 RX ORDER — VALSARTAN 80 MG/1
80 TABLET ORAL DAILY
Qty: 90 TABLET | Refills: 0 | Status: SHIPPED | OUTPATIENT
Start: 2025-06-16

## 2025-06-18 ENCOUNTER — TELEPHONE (OUTPATIENT)
Dept: SURGERY | Facility: CLINIC | Age: 65
End: 2025-06-18
Payer: COMMERCIAL

## 2025-06-18 NOTE — TELEPHONE ENCOUNTER
Gregorio Maravilla MD to P [facility]; Alyssa Underwood, LPN 6/13/2025 12:02 PM   Low risk cleared for procedure, hold Plavix 5-7 da

## 2025-06-19 ENCOUNTER — TELEPHONE (OUTPATIENT)
Dept: CARDIOLOGY | Facility: CLINIC | Age: 65
End: 2025-06-19
Payer: COMMERCIAL

## 2025-06-19 ENCOUNTER — PATIENT MESSAGE (OUTPATIENT)
Dept: SURGERY | Facility: CLINIC | Age: 65
End: 2025-06-19
Payer: COMMERCIAL

## 2025-06-27 ENCOUNTER — HOSPITAL ENCOUNTER (OUTPATIENT)
Dept: RADIOLOGY | Facility: HOSPITAL | Age: 65
Discharge: HOME | End: 2025-06-27
Attending: STUDENT IN AN ORGANIZED HEALTH CARE EDUCATION/TRAINING PROGRAM
Payer: COMMERCIAL

## 2025-06-27 ENCOUNTER — HOSPITAL ENCOUNTER (OUTPATIENT)
Dept: CARDIOLOGY | Facility: HOSPITAL | Age: 65
Discharge: HOME | End: 2025-06-27
Payer: COMMERCIAL

## 2025-06-27 DIAGNOSIS — R10.11 RUQ ABDOMINAL PAIN: ICD-10-CM

## 2025-06-27 DIAGNOSIS — R93.5 ABNORMAL ULTRASOUND OF ABDOMEN: ICD-10-CM

## 2025-06-27 DIAGNOSIS — Z01.818 PRE-OP TESTING: ICD-10-CM

## 2025-06-27 LAB
OHS QRS DURATION: 88 MS
OHS QTC CALCULATION: 408 MS

## 2025-06-27 PROCEDURE — 93005 ELECTROCARDIOGRAM TRACING: CPT | Mod: PO

## 2025-06-27 PROCEDURE — 71046 X-RAY EXAM CHEST 2 VIEWS: CPT | Mod: 26,,, | Performed by: STUDENT IN AN ORGANIZED HEALTH CARE EDUCATION/TRAINING PROGRAM

## 2025-06-27 PROCEDURE — 93010 ELECTROCARDIOGRAM REPORT: CPT | Mod: ,,, | Performed by: INTERNAL MEDICINE

## 2025-06-27 PROCEDURE — 71046 X-RAY EXAM CHEST 2 VIEWS: CPT | Mod: TC,PO

## 2025-07-07 ENCOUNTER — PATIENT MESSAGE (OUTPATIENT)
Dept: SURGERY | Facility: CLINIC | Age: 65
End: 2025-07-07
Payer: COMMERCIAL

## 2025-07-15 ENCOUNTER — HOSPITAL ENCOUNTER (OUTPATIENT)
Dept: PREADMISSION TESTING | Facility: HOSPITAL | Age: 65
Discharge: HOME | End: 2025-07-15
Attending: STUDENT IN AN ORGANIZED HEALTH CARE EDUCATION/TRAINING PROGRAM
Payer: COMMERCIAL

## 2025-07-15 ENCOUNTER — ANESTHESIA EVENT (OUTPATIENT)
Dept: SURGERY | Facility: HOSPITAL | Age: 65
End: 2025-07-15
Payer: COMMERCIAL

## 2025-07-16 ENCOUNTER — ANESTHESIA (OUTPATIENT)
Dept: SURGERY | Facility: HOSPITAL | Age: 65
End: 2025-07-16
Payer: COMMERCIAL

## 2025-07-16 ENCOUNTER — HOSPITAL ENCOUNTER (OUTPATIENT)
Facility: HOSPITAL | Age: 65
Discharge: HOME | End: 2025-07-16
Attending: STUDENT IN AN ORGANIZED HEALTH CARE EDUCATION/TRAINING PROGRAM | Admitting: STUDENT IN AN ORGANIZED HEALTH CARE EDUCATION/TRAINING PROGRAM
Payer: COMMERCIAL

## 2025-07-16 DIAGNOSIS — Z01.818 PREOP TESTING: ICD-10-CM

## 2025-07-16 DIAGNOSIS — R93.5 ABNORMAL US (ULTRASOUND) OF ABDOMEN: Primary | ICD-10-CM

## 2025-07-16 DIAGNOSIS — R10.11 RUQ PAIN: ICD-10-CM

## 2025-07-16 PROCEDURE — 27201423 OPTIME MED/SURG SUP & DEVICES STERILE SUPPLY: Performed by: STUDENT IN AN ORGANIZED HEALTH CARE EDUCATION/TRAINING PROGRAM

## 2025-07-16 PROCEDURE — 94799 UNLISTED PULMONARY SVC/PX: CPT

## 2025-07-16 PROCEDURE — 37000009 HC ANESTHESIA EA ADD 15 MINS: Performed by: STUDENT IN AN ORGANIZED HEALTH CARE EDUCATION/TRAINING PROGRAM

## 2025-07-16 PROCEDURE — 63600175 PHARM REV CODE 636 W HCPCS: Performed by: NURSE ANESTHETIST, CERTIFIED REGISTERED

## 2025-07-16 PROCEDURE — 36000710: Performed by: STUDENT IN AN ORGANIZED HEALTH CARE EDUCATION/TRAINING PROGRAM

## 2025-07-16 PROCEDURE — 71000033 HC RECOVERY, INTIAL HOUR: Performed by: STUDENT IN AN ORGANIZED HEALTH CARE EDUCATION/TRAINING PROGRAM

## 2025-07-16 PROCEDURE — 37000008 HC ANESTHESIA 1ST 15 MINUTES: Performed by: STUDENT IN AN ORGANIZED HEALTH CARE EDUCATION/TRAINING PROGRAM

## 2025-07-16 PROCEDURE — 71000015 HC POSTOP RECOV 1ST HR: Performed by: STUDENT IN AN ORGANIZED HEALTH CARE EDUCATION/TRAINING PROGRAM

## 2025-07-16 PROCEDURE — 63600175 PHARM REV CODE 636 W HCPCS: Performed by: ANESTHESIOLOGY

## 2025-07-16 PROCEDURE — 25000003 PHARM REV CODE 250: Performed by: STUDENT IN AN ORGANIZED HEALTH CARE EDUCATION/TRAINING PROGRAM

## 2025-07-16 PROCEDURE — 63600175 PHARM REV CODE 636 W HCPCS: Performed by: STUDENT IN AN ORGANIZED HEALTH CARE EDUCATION/TRAINING PROGRAM

## 2025-07-16 PROCEDURE — 99900031 HC PATIENT EDUCATION (STAT)

## 2025-07-16 PROCEDURE — 47562 LAPAROSCOPIC CHOLECYSTECTOMY: CPT | Mod: ,,, | Performed by: STUDENT IN AN ORGANIZED HEALTH CARE EDUCATION/TRAINING PROGRAM

## 2025-07-16 PROCEDURE — 25000003 PHARM REV CODE 250: Performed by: ANESTHESIOLOGY

## 2025-07-16 PROCEDURE — 25000003 PHARM REV CODE 250: Performed by: NURSE ANESTHETIST, CERTIFIED REGISTERED

## 2025-07-16 PROCEDURE — 36000711: Performed by: STUDENT IN AN ORGANIZED HEALTH CARE EDUCATION/TRAINING PROGRAM

## 2025-07-16 RX ORDER — ONDANSETRON HYDROCHLORIDE 2 MG/ML
4 INJECTION, SOLUTION INTRAVENOUS ONCE AS NEEDED
Status: DISCONTINUED | OUTPATIENT
Start: 2025-07-16 | End: 2025-07-16 | Stop reason: HOSPADM

## 2025-07-16 RX ORDER — BUPIVACAINE HYDROCHLORIDE AND EPINEPHRINE 2.5; 5 MG/ML; UG/ML
INJECTION, SOLUTION EPIDURAL; INFILTRATION; INTRACAUDAL; PERINEURAL
Status: DISCONTINUED | OUTPATIENT
Start: 2025-07-16 | End: 2025-07-16 | Stop reason: HOSPADM

## 2025-07-16 RX ORDER — MIDAZOLAM HYDROCHLORIDE 1 MG/ML
INJECTION INTRAMUSCULAR; INTRAVENOUS
Status: DISCONTINUED | OUTPATIENT
Start: 2025-07-16 | End: 2025-07-16

## 2025-07-16 RX ORDER — LIDOCAINE HYDROCHLORIDE 10 MG/ML
1 INJECTION, SOLUTION EPIDURAL; INFILTRATION; INTRACAUDAL; PERINEURAL ONCE
Status: DISCONTINUED | OUTPATIENT
Start: 2025-07-16 | End: 2025-07-16 | Stop reason: HOSPADM

## 2025-07-16 RX ORDER — OXYCODONE HYDROCHLORIDE 5 MG/1
5 TABLET ORAL ONCE AS NEEDED
Status: COMPLETED | OUTPATIENT
Start: 2025-07-16 | End: 2025-07-16

## 2025-07-16 RX ORDER — FENTANYL CITRATE 50 UG/ML
25 INJECTION, SOLUTION INTRAMUSCULAR; INTRAVENOUS EVERY 5 MIN PRN
Status: COMPLETED | OUTPATIENT
Start: 2025-07-16 | End: 2025-07-16

## 2025-07-16 RX ORDER — NAPROXEN SODIUM 220 MG/1
81 TABLET, FILM COATED ORAL DAILY
COMMUNITY

## 2025-07-16 RX ORDER — ACETAMINOPHEN 10 MG/ML
INJECTION, SOLUTION INTRAVENOUS
Status: DISCONTINUED | OUTPATIENT
Start: 2025-07-16 | End: 2025-07-16

## 2025-07-16 RX ORDER — FENTANYL CITRATE 50 UG/ML
INJECTION, SOLUTION INTRAMUSCULAR; INTRAVENOUS
Status: DISCONTINUED | OUTPATIENT
Start: 2025-07-16 | End: 2025-07-16

## 2025-07-16 RX ORDER — OXYCODONE AND ACETAMINOPHEN 5; 325 MG/1; MG/1
1 TABLET ORAL EVERY 4 HOURS PRN
Qty: 21 TABLET | Refills: 0 | Status: SHIPPED | OUTPATIENT
Start: 2025-07-16

## 2025-07-16 RX ORDER — ONDANSETRON HYDROCHLORIDE 2 MG/ML
INJECTION, SOLUTION INTRAMUSCULAR; INTRAVENOUS
Status: DISCONTINUED | OUTPATIENT
Start: 2025-07-16 | End: 2025-07-16

## 2025-07-16 RX ORDER — SUCCINYLCHOLINE CHLORIDE 20 MG/ML
INJECTION INTRAMUSCULAR; INTRAVENOUS
Status: DISCONTINUED | OUTPATIENT
Start: 2025-07-16 | End: 2025-07-16

## 2025-07-16 RX ORDER — NEOSTIGMINE METHYLSULFATE 1 MG/ML
INJECTION INTRAVENOUS
Status: DISCONTINUED | OUTPATIENT
Start: 2025-07-16 | End: 2025-07-16

## 2025-07-16 RX ORDER — SODIUM CHLORIDE 9 MG/ML
INJECTION, SOLUTION INTRAVENOUS CONTINUOUS
Status: DISCONTINUED | OUTPATIENT
Start: 2025-07-16 | End: 2025-07-16 | Stop reason: HOSPADM

## 2025-07-16 RX ORDER — PHENYLEPHRINE HYDROCHLORIDE 10 MG/ML
INJECTION INTRAVENOUS
Status: DISCONTINUED | OUTPATIENT
Start: 2025-07-16 | End: 2025-07-16

## 2025-07-16 RX ORDER — PROPOFOL 10 MG/ML
VIAL (ML) INTRAVENOUS
Status: DISCONTINUED | OUTPATIENT
Start: 2025-07-16 | End: 2025-07-16

## 2025-07-16 RX ORDER — ROCURONIUM BROMIDE 10 MG/ML
INJECTION, SOLUTION INTRAVENOUS
Status: DISCONTINUED | OUTPATIENT
Start: 2025-07-16 | End: 2025-07-16

## 2025-07-16 RX ORDER — LIDOCAINE HYDROCHLORIDE 20 MG/ML
INJECTION INTRAVENOUS
Status: DISCONTINUED | OUTPATIENT
Start: 2025-07-16 | End: 2025-07-16

## 2025-07-16 RX ORDER — CEFAZOLIN 2 G/1
2 INJECTION, POWDER, FOR SOLUTION INTRAMUSCULAR; INTRAVENOUS
Status: COMPLETED | OUTPATIENT
Start: 2025-07-16 | End: 2025-07-16

## 2025-07-16 RX ADMIN — ROCURONIUM BROMIDE 5 MG: 10 INJECTION, SOLUTION INTRAVENOUS at 07:07

## 2025-07-16 RX ADMIN — FENTANYL CITRATE 50 MCG: 50 INJECTION, SOLUTION INTRAMUSCULAR; INTRAVENOUS at 08:07

## 2025-07-16 RX ADMIN — ACETAMINOPHEN 1000 MG: 10 INJECTION INTRAVENOUS at 07:07

## 2025-07-16 RX ADMIN — MIDAZOLAM HYDROCHLORIDE 2 MG: 1 INJECTION, SOLUTION INTRAMUSCULAR; INTRAVENOUS at 07:07

## 2025-07-16 RX ADMIN — CEFAZOLIN 2 G: 2 INJECTION, POWDER, FOR SOLUTION INTRAMUSCULAR; INTRAVENOUS at 07:07

## 2025-07-16 RX ADMIN — ROCURONIUM BROMIDE 35 MG: 10 INJECTION, SOLUTION INTRAVENOUS at 07:07

## 2025-07-16 RX ADMIN — GLYCOPYRROLATE 0.5 MG: 0.2 INJECTION, SOLUTION INTRAMUSCULAR; INTRAVITREAL at 08:07

## 2025-07-16 RX ADMIN — PROPOFOL 150 MG: 10 INJECTION, EMULSION INTRAVENOUS at 07:07

## 2025-07-16 RX ADMIN — FENTANYL CITRATE 25 MCG: 50 INJECTION INTRAMUSCULAR; INTRAVENOUS at 09:07

## 2025-07-16 RX ADMIN — OXYCODONE HYDROCHLORIDE 5 MG: 5 TABLET ORAL at 09:07

## 2025-07-16 RX ADMIN — ONDANSETRON 4 MG: 2 INJECTION INTRAMUSCULAR; INTRAVENOUS at 07:07

## 2025-07-16 RX ADMIN — SODIUM CHLORIDE, SODIUM GLUCONATE, SODIUM ACETATE, POTASSIUM CHLORIDE AND MAGNESIUM CHLORIDE: 526; 502; 368; 37; 30 INJECTION, SOLUTION INTRAVENOUS at 06:07

## 2025-07-16 RX ADMIN — PROPOFOL 30 MG: 10 INJECTION, EMULSION INTRAVENOUS at 07:07

## 2025-07-16 RX ADMIN — FENTANYL CITRATE 25 MCG: 50 INJECTION INTRAMUSCULAR; INTRAVENOUS at 08:07

## 2025-07-16 RX ADMIN — PHENYLEPHRINE HYDROCHLORIDE 200 MCG: 10 INJECTION INTRAVENOUS at 07:07

## 2025-07-16 RX ADMIN — LIDOCAINE HYDROCHLORIDE 100 MG: 20 INJECTION, SOLUTION INTRAVENOUS at 07:07

## 2025-07-16 RX ADMIN — FENTANYL CITRATE 75 MCG: 50 INJECTION, SOLUTION INTRAMUSCULAR; INTRAVENOUS at 07:07

## 2025-07-16 RX ADMIN — FENTANYL CITRATE 25 MCG: 50 INJECTION, SOLUTION INTRAMUSCULAR; INTRAVENOUS at 08:07

## 2025-07-16 RX ADMIN — NEOSTIGMINE METHYLSULFATE 5 MG: 1 INJECTION INTRAVENOUS at 08:07

## 2025-07-16 RX ADMIN — SUCCINYLCHOLINE CHLORIDE 150 MG: 20 INJECTION, SOLUTION INTRAMUSCULAR; INTRAVENOUS at 07:07

## 2025-07-16 NOTE — ANESTHESIA PREPROCEDURE EVALUATION
07/16/2025  Niraj Diaz is a 65 y.o., male.      Pre-op Assessment    I have reviewed the Patient Summary Reports.     I have reviewed the Nursing Notes. I have reviewed the NPO Status.   I have reviewed the Medications.     Review of Systems  Anesthesia Hx:             Denies Family Hx of Anesthesia complications.    Denies Personal Hx of Anesthesia complications.                    Cardiovascular:     Hypertension  Past MI CAD  asymptomatic CABG/stent           ECG has been reviewed.                            Pulmonary:        Sleep Apnea           Education provided regarding risk of obstructive sleep apnea            Musculoskeletal:         Spine Disorders: lumbar Chronic Pain           Neurological:   CVA, no residual symptoms                                    Endocrine:        Obesity / BMI > 30      Physical Exam  General: Cooperative, Alert and Oriented    Airway:  Mallampati: III / II  Mouth Opening: Normal  TM Distance: Normal  Tongue: Normal  Neck ROM: Normal ROM  Neck: Girth Increased    Dental:  Dentures    Chest/Lungs:  Normal Respiratory Rate    Heart:  Rate: Normal  Rhythm: Regular Rhythm        Anesthesia Plan  Type of Anesthesia, risks & benefits discussed:    Anesthesia Type: Gen ETT  Intra-op Monitoring Plan: Standard ASA Monitors  Post Op Pain Control Plan: multimodal analgesia  Induction:  IV  Airway Plan: Video  Informed Consent: Informed consent signed with the Patient and all parties understand the risks and agree with anesthesia plan.  All questions answered.   ASA Score: 3    Ready For Surgery From Anesthesia Perspective.     .

## 2025-07-16 NOTE — ANESTHESIA PROCEDURE NOTES
Intubation    Date/Time: 7/16/2025 7:37 AM    Performed by: Donnell Hope CRNA  Authorized by: Robert Donis MD    Intubation:     Induction:  Intravenous    Intubated:  Postinduction    Mask Ventilation:  Easy mask    Attempts:  1    Attempted By:  CRNA    Method of Intubation:  Video laryngoscopy    Blade:  Meredith 3    Laryngeal View Grade: Grade I - full view of cords      Difficult Airway Encountered?: No      Complications:  None    Airway Device:  Oral endotracheal tube    Airway Device Size:  7.5    Style/Cuff Inflation:  Cuffed    Tube secured:  22    Secured at:  The lips    Placement Verified By:  Capnometry    Complicating Factors:  None    Findings Post-Intubation:  BS equal bilateral

## 2025-07-16 NOTE — OP NOTE
[facility]  Surgery Department  Operative Note    SUMMARY     Date of Procedure: 7/16/2025     Procedure: Procedure(s) (LRB):  ROBOTIC CHOLECYSTECTOMY (N/A)     Surgeons and Role:     * Jose Eduardo Kemp MD - Primary    Assisting Surgeon: Lauren Campos RNFA    Pre-Operative Diagnosis: RUQ pain [R10.11]  Abnormal US (ultrasound) of abdomen [R93.5]    Post-Operative Diagnosis: Post-Op Diagnosis Codes:     * RUQ pain [R10.11]     * Abnormal US (ultrasound) of abdomen [R93.5]    Anesthesia: General    Operative Findings (including complications, if any):  Mild inflammation near the neck of the gallbladder.  Cholecystectomy    Description of Technical Procedures: \this is a 65-year-old male who presented with biliary colic.  He did consent to cholecystectomy.  He was taken to the OR, placed supine, general endotracheal anesthesia was induced, the abdomen was prepped and draped in the usual fashion, a time-out was completed.  Access to the abdomen was achieved using Optiview technique in the left upper quadrant.  The abdomen was insufflated to 15 mmHg a scope was inserted.  There was no apparent injury during entry.  Three 8 mm robotic trocars were placed across the abdomen under direct visualization and the robot was docked.  The gallbladder was identified.  The dome was grasped and retracted cephalad.  There was mild inflammatory changes at the neck consistent with chronic cholecystitis.  Peritoneum was stripped off the base exposing the cystic duct and artery as they entered the gallbladder.  These were clipped and then divided.  The gallbladder was removed off the fossa using electrocautery, placed in an Endo-Catch bag, and removed from the left upper quadrant incision.  The right upper quadrant was inspected for hemostasis which was adequate.  Trocars were removed and insufflation was allowed to escape.  Fascia at the extraction site was closed with an 0 Vicryl suture.  Skin was closed  with 4-0 Monocryl.  Dermabond was applied as a dressing.  Patient tolerated the entire procedure without apparent complication, was extubated in the OR, brought to recovery in stable condition.  All counts were correct.    Significant Surgical Tasks Conducted by the Assistant(s), if Applicable:  Patient positioning and prep, bedside assist, skin closure, bandage application.    Estimated Blood Loss (EBL):  Minimal           Implants: * No implants in log *    Specimens:   Specimen (24h ago, onward)       Start     Ordered    07/16/25 0811  Specimen to Pathology - Surgery  Once        Comments: Pre-op Diagnosis: RUQ pain [R10.11]Abnormal US (ultrasound) of abdomen [R93.5]Procedure(s):ROBOTIC CHOLECYSTECTOMY Number of specimens: 1Name of specimens: Gallbladder     Question:  Release to patient  Answer:  Immediate    07/16/25 0810                   * No specimens in log *           Condition: Good    Disposition: PACU - hemodynamically stable.    Attestation: Op Note Attestation: I was physically present and scrubbed for the entire procedure.

## 2025-07-16 NOTE — H&P
History & Physical     Subjective  History of Present Illness:  Patient is a 65 y.o. male presents with right upper quadrant abdominal pain.  This is worse usually after meals and usually after fatty meals in particular.  No prior abdominal surgery.  He was referred to me after an ultrasound showed sludge in the gallbladder.          Chief Complaint   Patient presents with    Consult       Gallbladder              Review of patient's allergies indicates:   Allergen Reactions    Gluten protein           [Current Medications]    [Current Medications]         Current Outpatient Medications   Medication Sig Dispense Refill    atorvastatin (LIPITOR) 40 MG tablet Take 1 tablet (40 mg total) by mouth once daily. 90 tablet 2    clopidogreL (PLAVIX) 75 mg tablet Take 1 tablet (75 mg total) by mouth once daily. 90 tablet 4    glucosamine HCl 1,500 mg Tab          metoprolol succinate (TOPROL-XL) 50 MG 24 hr tablet Take 1 tablet (50 mg total) by mouth once daily. 90 tablet 3    multivit with minerals/lutein (MULTIVITAMIN 50 PLUS ORAL)          nitroGLYCERIN (NITROSTAT) 0.4 MG SL tablet Place 1 tablet (0.4 mg total) under the tongue every 5 (five) minutes as needed for Chest pain. 25 tablet 4    traMADoL (ULTRAM) 50 mg tablet Take 1 tablet (50 mg total) by mouth every 6 (six) hours as needed for Pain. 10 tablet 0    valsartan (DIOVAN) 80 MG tablet Take 1 tablet (80 mg total) by mouth once daily. 90 tablet 0      No current facility-administered medications for this visit.             Past Medical History:   Diagnosis Date    Bulging lumbar disc      Heart attack      Hypertension      Stroke              Past Surgical History:   Procedure Laterality Date    VASECTOMY                 Family History   Problem Relation Name Age of Onset    Hypertension Mother        Osteoarthritis Mother        Heart disease Mother        Prostate cancer Father        Hypertension Maternal Grandfather        Heart disease Maternal Grandfather      "     [Social History]     [Social History]        Tobacco Use    Smoking status: Never    Smokeless tobacco: Never    Tobacco comments:       2pack a day 40 years ago   Substance Use Topics    Alcohol use: No    Drug use: No        Review of Systems:  Review of Systems   Constitutional: Negative.  Negative for fatigue and fever.   HENT: Negative.     Eyes: Negative.    Respiratory: Negative.  Negative for shortness of breath.    Cardiovascular: Negative.  Negative for chest pain.   Gastrointestinal:  Positive for abdominal pain.   Endocrine: Negative.    Genitourinary: Negative.    Musculoskeletal: Negative.    Skin: Negative.    Allergic/Immunologic: Negative.    Neurological: Negative.    Hematological: Negative.    Psychiatric/Behavioral: Negative.              Objective  Vital Signs (Most Recent)  Temp: 97.5 °F (36.4 °C) (05/20/25 0838)  Pulse: 82 (05/20/25 0838)  BP: (!) 144/83 (05/20/25 0838)  5' 8" (1.727 m)  107.9 kg (237 lb 14 oz)      Physical Exam:  Physical Exam  Constitutional:       General: He is not in acute distress.     Appearance: Normal appearance. He is not ill-appearing, toxic-appearing or diaphoretic.   HENT:      Head: Normocephalic.      Nose: Nose normal.   Eyes:      Conjunctiva/sclera: Conjunctivae normal.   Cardiovascular:      Rate and Rhythm: Normal rate and regular rhythm.   Pulmonary:      Effort: Pulmonary effort is normal.   Abdominal:      Palpations: Abdomen is soft.   Musculoskeletal:         General: Normal range of motion.      Cervical back: Normal range of motion.   Skin:     General: Skin is warm.   Neurological:      General: No focal deficit present.      Mental Status: He is alert.   Psychiatric:         Mood and Affect: Mood normal.            Laboratory  CBC, lipase, CMP were all normal     Diagnostic Results:  Ultrasound shows sludge in the gallbladder without secondary signs of cholecystitis        Assessment and Plan  65-year-old male with gall stones/sludge.   "   PLAN:  Risks versus benefits of observation versus proceeding with cholecystectomy were discussed.  Given BMI, would recommend proceeding with robotic intervention as this is the safest route for surgery.  We did discuss that surgery may fail to alleviate his current symptoms.  Additional tests including HIDA scan and or CT imaging were discussed.  Patient is leaning towards operative intervention and will likely call to set up a surgery date in the near future.  Consent was obtained for the planned procedure.

## 2025-07-16 NOTE — DISCHARGE INSTRUCTIONS
"Discharge Instructions: After Your Surgery/Procedure  Youve just had surgery. During surgery you were given medicine called anesthesia to keep you relaxed and free of pain. After surgery you may have some pain or nausea. This is common. Here are some tips for feeling better and getting well after surgery.     Stay on schedule with your medication.   Going home  Your doctor or nurse will show you how to take care of yourself when you go home. He or she will also answer your questions. Have an adult family member or friend drive you home.      For your safety we recommend these precaution for the first 24 hours after your procedure:  Do not drive or use heavy equipment.  Do not make important decisions or sign legal papers.  Do not drink alcohol.  Have someone stay with you, if needed. He or she can watch for problems and help keep you safe.  Your concentration, balance, coordination, and judgement may be impaired for many hours after anesthesia.  Use caution when ambulating or standing up.     You may feel weak and "washed out" after anesthesia and surgery.      Subtle residual effects of general anesthesia or sedation with regional / local anesthesia can last more than 24 hours.  Rest for the remainder of the day or longer if your Doctor/Surgeon has advised you to do so.  Although you may feel normal within the first 24 hours, your reflexes and mental ability may be impaired without you realizing it.  You may feel dizzy, lightheaded or sleepy for 24 hours or longer.      Be sure to go to all follow-up visits with your doctor. And rest after your surgery for as long as your doctor tells you to.  Coping with pain  If you have pain after surgery, pain medicine will help you feel better. Take it as told, before pain becomes severe. Also, ask your doctor or pharmacist about other ways to control pain. This might be with heat, ice, or relaxation. And follow any other instructions your surgeon or nurse gives you.  Tips " for taking pain medicine  To get the best relief possible, remember these points:  Pain medicines can upset your stomach. Taking them with a little food may help.  Most pain relievers taken by mouth need at least 20 to 30 minutes to start to work.  Taking medicine on a schedule can help you remember to take it. Try to time your medicine so that you can take it before starting an activity. This might be before you get dressed, go for a walk, or sit down for dinner.  Constipation is a common side effect of pain medicines. Call your doctor before taking any medicines such as laxatives or stool softeners to help ease constipation. Also ask if you should skip any foods. Drinking lots of fluids and eating foods such as fruits and vegetables that are high in fiber can also help. Remember, do not take laxatives unless your surgeon has prescribed them.  Drinking alcohol and taking pain medicine can cause dizziness and slow your breathing. It can even be deadly. Do not drink alcohol while taking pain medicine.  Pain medicine can make you react more slowly to things. Do not drive or run machinery while taking pain medicine.  Your health care provider may tell you to take acetaminophen to help ease your pain. Ask him or her how much you are supposed to take each day. Acetaminophen or other pain relievers may interact with your prescription medicines or other over-the-counter (OTC) drugs. Some prescription medicines have acetaminophen and other ingredients. Using both prescription and OTC acetaminophen for pain can cause you to overdose. Read the labels on your OTC medicines with care. This will help you to clearly know the list of ingredients, how much to take, and any warnings. It may also help you not take too much acetaminophen. If you have questions or do not understand the information, ask your pharmacist or health care provider to explain it to you before you take the OTC medicine.  Managing nausea  Some people have an  upset stomach after surgery. This is often because of anesthesia, pain, or pain medicine, or the stress of surgery. These tips will help you handle nausea and eat healthy foods as you get better. If you were on a special food plan before surgery, ask your doctor if you should follow it while you get better. These tips may help:  Do not push yourself to eat. Your body will tell you when to eat and how much.  Start off with clear liquids and soup. They are easier to digest.  Next try semi-solid foods, such as mashed potatoes, applesauce, and gelatin, as you feel ready.  Slowly move to solid foods. Dont eat fatty, rich, or spicy foods at first.  Do not force yourself to have 3 large meals a day. Instead eat smaller amounts more often.  Take pain medicines with a small amount of solid food, such as crackers or toast, to avoid nausea.     Call your surgeon if  You still have pain an hour after taking medicine. The medicine may not be strong enough.  You feel too sleepy, dizzy, or groggy. The medicine may be too strong.  You have side effects like nausea, vomiting, or skin changes, such as rash, itching, or hives.       If you have obstructive sleep apnea  You were given anesthesia medicine during surgery to keep you comfortable and free of pain. After surgery, you may have more apnea spells because of this medicine and other medicines you were given. The spells may last longer than usual.   At home:  Keep using the continuous positive airway pressure (CPAP) device when you sleep. Unless your health care provider tells you not to, use it when you sleep, day or night. CPAP is a common device used to treat obstructive sleep apnea.  Talk with your provider before taking any pain medicine, muscle relaxants, or sedatives. Your provider will tell you about the possible dangers of taking these medicines.  © 2284-4540 [employer]. [address] [place] [address], [place] [address]. All rights reserved. This information is  not intended as a substitute for professional medical care. Always follow your healthcare professional's instructions.          Using an Incentive Spirometer    An incentive spirometer is a device that helps you do deep breathing exercises. These exercises expand your lungs, aid in circulation, and help prevent pneumonia. Deep breathing exercises also help you breathe better and improve the function of your lungs by:  Keeping your lungs clear  Strengthening your breathing muscles  Helping prevent respiratory complications or problems  The incentive spirometer gives you a way to take an active part in recover. A nurse or therapist will teach you breathing exercises. To do these exercises, you will breathe in through your mouth and not your nose. The incentive spirometer only works correctly if you breathe in through your mouth.  Steps to clear lungs  Step 1. Exhale normally. Then, inhale normally.  Relax and breathe out.  Step 2. Place your lips tightly around the mouthpiece.  Make sure the device is upright and not tilted.  Step 3. Inhale as much air as you can through the mouthpiece (don't breath through your nose).  Inhale slowly and deeply.  Hold your breath long enough to keep the balls or disk raised for at least 3 to 5 seconds, or as instructed by your healthcare provider.  Some spirometers have an indicator to let you know that you are breathing in too fast. If the indicator goes off, breathe in more slowly.  Step 4. Repeat the exercise regularly.  Do this exercise every hour while you're awake, or as instructed by your healthcare provider.  If you were taught deep breathing and coughing exercises, do them regularly as instructed by your healthcare provider.           Post op instructions for prevention of DVT  What is deep vein thrombosis?  Deep vein thrombosis (DVT) is the medical term for blood clots in the deep veins of the leg.  These blood clots can be dangerous.  A DVT can block a blood vessel and keep  blood from getting where it needs to go.  Another problem is that the clot can travel to other parts of the body such as the lungs.  A clot that travels to the lungs is called a pulmonary embolus (PE) and can cause serious problems with breathing which can lead to death.  Am I at risk for DVT/PE?  If you are not very active, you are at risk of DVT.  Anyone confined to bed, sitting for long periods of time, recovering from surgery, etc. increases the risk of DVT.  Other risk factors are cancer diagnosis, certain medications, estrogen replacement in any form,older age, obesity, pregnancy, smoking, history of clotting disorders, and dehydration.  How will I know if I have a DVT?  Swelling in the lower leg  Pain  Warmth, redness, hardness or bulging of the vein  If you have any of these symptoms, call your doctors office right away.  Some people will not have any symptoms until the clot moves to the lungs.  What are the symptoms of a PE?  Panting, shortness of breath, or trouble breathing  Sharp, knife-like chest pain when you breathe  Coughing or coughing up blood  Rapid heartbeat  If you have any of these symptoms or get worse quickly, call 911 for emergency treatment.  How can I prevent a DVT?  Avoid long periods of inactivity and dont cross your legs--get up and walk around every hour or so.  Stay active--walking after surgery is highly encouraged.  This means you should get out of the house and walk in the neighborhood.  Going up and down stairs will not impair healing (unless advised against such activity by your doctor).    Drink plenty of noncaffeinated, nonalcoholic fluids each day to prevent dehydration.  Wear special support stockings, if they have been advised by your doctor.  If you travel, stop at least once an hour and walk around.  Avoid smoking (assistance with stopping is available through your healthcare provider)  Always notify your doctor if you are not able to follow the post operative  instructions that are given to you at the time of discharge.  It may be necessary to prescribe one of the medications available to prevent DVT.            We hope your stay was comfortable as you heal now, mend and rest.    For we have enjoyed taking care of you by giving your our best.    And as you get better, by regaining your health and strength;   We count it as a privilege to have served you and hope your time at [facility] was well spent.      Thank  You!!!

## 2025-07-16 NOTE — TRANSFER OF CARE
"Anesthesia Transfer of Care Note    Patient: Niraj Diaz    Procedure(s) Performed: Procedure(s) (LRB):  ROBOTIC CHOLECYSTECTOMY (N/A)    Patient location: PACU    Anesthesia Type: general    Transport from OR: Transported from OR on 2-3 L/min O2 by NC with adequate spontaneous ventilation    Post pain: adequate analgesia    Post assessment: no apparent anesthetic complications    Post vital signs: stable    Level of consciousness: responds to stimulation    Nausea/Vomiting: no nausea/vomiting    Complications: none    Transfer of care protocol was followed      Last vitals: Visit Vitals  BP (!) 145/92 (BP Location: Right arm, Patient Position: Lying)   Pulse 82   Temp 36.9 °C (98.4 °F) (Skin)   Resp 18   Ht 5' 8" (1.727 m)   Wt 107.5 kg (237 lb)   SpO2 97%   BMI 36.04 kg/m²     "

## 2025-07-16 NOTE — DISCHARGE SUMMARY
[facility]  Brief Operative Note    Surgery Date: 7/16/2025     Surgeons and Role:     * Jose Eduardo Kemp MD - Primary    Assisting Surgeon: None    Pre-op Diagnosis:  RUQ pain [R10.11]  Abnormal US (ultrasound) of abdomen [R93.5]    Post-op Diagnosis:  Post-Op Diagnosis Codes:     * RUQ pain [R10.11]     * Abnormal US (ultrasound) of abdomen [R93.5]    Procedure(s) (LRB):  ROBOTIC CHOLECYSTECTOMY (N/A)    Anesthesia: General    Operative Findings:  Cholecystectomy    Estimated Blood Loss:  Minimal         Specimens:   Specimen (24h ago, onward)       Start     Ordered    07/16/25 0811  Specimen to Pathology - Surgery  Once        Comments: Pre-op Diagnosis: RUQ pain [R10.11]Abnormal US (ultrasound) of abdomen [R93.5]Procedure(s):ROBOTIC CHOLECYSTECTOMY Number of specimens: 1Name of specimens: Gallbladder     Question:  Release to patient  Answer:  Immediate    07/16/25 0810                    * No specimens in log *        Discharge Note    OUTCOME: Patient tolerated treatment/procedure well without complication and is now ready for discharge.    DISPOSITION: Home or Self Care    FINAL DIAGNOSIS:  Abnormal US (ultrasound) of abdomen    FOLLOWUP: In clinic    DISCHARGE INSTRUCTIONS:    Discharge Procedure Orders   Diet Adult Regular     Lifting restrictions   Order Comments: No more than 10 lbs     No driving until:   Order Comments: Off narcotics     Notify your health care provider if you experience any of the following:  increased confusion or weakness     Notify your health care provider if you experience any of the following:  persistent dizziness, light-headedness, or visual disturbances     Notify your health care provider if you experience any of the following:  worsening rash     Notify your health care provider if you experience any of the following:  severe persistent headache     Notify your health care provider if you experience any of the following:  difficulty breathing or  increased cough     Notify your health care provider if you experience any of the following:  redness, tenderness, or signs of infection (pain, swelling, redness, odor or green/yellow discharge around incision site)     Notify your health care provider if you experience any of the following:  severe uncontrolled pain     Notify your health care provider if you experience any of the following:  persistent nausea and vomiting or diarrhea     Notify your health care provider if you experience any of the following:  temperature >100.4     No dressing needed   Order Comments: Okay to shower.  No swimming or soaking for 2 weeks.

## 2025-07-16 NOTE — PLAN OF CARE
Per Dr. Kemp, patient can restart his aspirin and plavix tomorrow. Educated patient and patient's spouse, both verbalized understanding.

## 2025-07-16 NOTE — PLAN OF CARE
Patient awake, alert, oriented. Vital signs stable. Patient verbalizes readiness for discharge. Discharge instructions reviewed with patient and patient's spouse. Patient and patient's spouse verbalized understanding. IV removed, catheter intact. Incisions to abdomen clean, dry, and intact. All belongings returned to patient. Patient transferred to car via wheelchair per ANGELINE Manley. Safety maintained.

## 2025-07-17 VITALS
DIASTOLIC BLOOD PRESSURE: 74 MMHG | OXYGEN SATURATION: 100 % | HEIGHT: 68 IN | RESPIRATION RATE: 15 BRPM | WEIGHT: 237 LBS | BODY MASS INDEX: 35.92 KG/M2 | TEMPERATURE: 98 F | SYSTOLIC BLOOD PRESSURE: 138 MMHG | HEART RATE: 69 BPM

## 2025-07-17 NOTE — ANESTHESIA POSTPROCEDURE EVALUATION
Anesthesia Post Evaluation    Patient: Niraj Diaz    Procedure(s) Performed: Procedure(s) (LRB):  ROBOTIC CHOLECYSTECTOMY (N/A)    Final Anesthesia Type: general      Patient location during evaluation: PACU  Patient participation: Yes- Able to Participate  Level of consciousness: awake and alert  Post-procedure vital signs: reviewed and stable  Pain management: adequate  Airway patency: patent    PONV status at discharge: No PONV  Anesthetic complications: no      Cardiovascular status: blood pressure returned to baseline  Respiratory status: unassisted  Hydration status: euvolemic  Follow-up not needed.              Vitals Value Taken Time   /74 07/16/25 10:00   Temp 36.6 °C (97.9 °F) 07/16/25 08:30   Pulse 69 07/16/25 10:00   Resp 15 07/16/25 10:00   SpO2 100 % 07/16/25 10:00         Event Time   Out of Recovery 09:27:00         Pain/Patricia Score: Pain Rating Prior to Med Admin: 4 (7/16/2025  9:05 AM)  Pain Rating Post Med Admin: 6 (7/16/2025  9:00 AM)  Patricia Score: 10 (7/16/2025  9:15 AM)  Modified Patricia Score: 19 (7/16/2025 10:15 AM)

## (undated) DEVICE — DRAPE COLUMN DAVINCI XI

## (undated) DEVICE — GLOVE SENSICARE PI ALOE 7

## (undated) DEVICE — SUT MCRYL PLUS 4-0 PS2 27IN

## (undated) DEVICE — SEAL CANN UNIVERSAL 5-12MM

## (undated) DEVICE — SOL CLEARIFY VISUALIZATION LAP

## (undated) DEVICE — TUBING SUC UNIV W/CONN 12FT

## (undated) DEVICE — SLEEVE SCD EXPRESS KNEE MEDIUM

## (undated) DEVICE — SUT EASE CROSSBOW CLSR SYS

## (undated) DEVICE — PACK CUSTOM ENDO CHOLO SLI

## (undated) DEVICE — GLOVE SENSICARE PI ALOE 7.5

## (undated) DEVICE — CANNULA REDUCER 12-8MM

## (undated) DEVICE — GOWN POLY REINF X-LONG XL

## (undated) DEVICE — TROCAR ENDO Z THREAD KII 5X100

## (undated) DEVICE — SUT VICRYL+ 27 UR-6 VIOL

## (undated) DEVICE — GLOVE SENSICARE PI GRN 7

## (undated) DEVICE — ELECTRODE MEGADYNE RETURN DUAL

## (undated) DEVICE — BAG TISS RETRV MONARCH 10MM

## (undated) DEVICE — SET TUBE PNEUMOCLEAR SE HI FLO

## (undated) DEVICE — ADHESIVE DERMABOND ADVANCED

## (undated) DEVICE — SOL NACL IRR 1000ML BTL

## (undated) DEVICE — GLOVE SENSICARE PI GRN 6.5

## (undated) DEVICE — LINER SUCTION 3000CC

## (undated) DEVICE — STRAP OR TABLE 5IN X 72IN

## (undated) DEVICE — GLOVE SENSICARE PI ALOE 6.5

## (undated) DEVICE — CANNULA SEAL 12MM

## (undated) DEVICE — SYR ONLY LUER LOCK 20CC

## (undated) DEVICE — DRAPE ARM DAVINCI XI

## (undated) DEVICE — TOWEL OR DISP STRL BLUE 4/PK

## (undated) DEVICE — GOWN POLY REINF BRTH SLV XL

## (undated) DEVICE — SOL ELECTROLUBE ANTI-STIC

## (undated) DEVICE — CLIP HEMO-LOK MLX LARGE LF

## (undated) DEVICE — NDL SAFETY 21G X 1 1/2 ECLPSE

## (undated) DEVICE — OBTURATOR BLADELESS 8MM XI CLR